# Patient Record
Sex: FEMALE | Race: BLACK OR AFRICAN AMERICAN | Employment: OTHER | URBAN - METROPOLITAN AREA
[De-identification: names, ages, dates, MRNs, and addresses within clinical notes are randomized per-mention and may not be internally consistent; named-entity substitution may affect disease eponyms.]

---

## 2017-01-18 ENCOUNTER — TELEPHONE (OUTPATIENT)
Dept: FAMILY MEDICINE CLINIC | Age: 72
End: 2017-01-18

## 2017-01-18 NOTE — TELEPHONE ENCOUNTER
Patient called to ask if there is another endocrinologist as she is asking for one in this area instead of Dr. Mandi Ayala.     Saba Rios MD

## 2017-03-09 ENCOUNTER — OFFICE VISIT (OUTPATIENT)
Dept: FAMILY MEDICINE CLINIC | Age: 72
End: 2017-03-09

## 2017-03-09 VITALS
OXYGEN SATURATION: 100 % | SYSTOLIC BLOOD PRESSURE: 141 MMHG | HEART RATE: 116 BPM | RESPIRATION RATE: 20 BRPM | BODY MASS INDEX: 34.78 KG/M2 | HEIGHT: 62 IN | DIASTOLIC BLOOD PRESSURE: 91 MMHG | TEMPERATURE: 98 F | WEIGHT: 189 LBS

## 2017-03-09 DIAGNOSIS — M54.9 BACK PAIN, UNSPECIFIED BACK LOCATION, UNSPECIFIED BACK PAIN LATERALITY, UNSPECIFIED CHRONICITY: ICD-10-CM

## 2017-03-09 DIAGNOSIS — M53.3 SACRO ILIAL PAIN: Primary | ICD-10-CM

## 2017-03-09 DIAGNOSIS — I10 ESSENTIAL HYPERTENSION: ICD-10-CM

## 2017-03-09 DIAGNOSIS — L98.9 SKIN LESION: ICD-10-CM

## 2017-03-09 RX ORDER — MELOXICAM 7.5 MG/1
7.5 TABLET ORAL DAILY
Qty: 14 TAB | Refills: 0 | Status: SHIPPED | OUTPATIENT
Start: 2017-03-09 | End: 2017-03-21 | Stop reason: ALTCHOICE

## 2017-03-09 NOTE — PROGRESS NOTES
Chief Complaint   Patient presents with    Hip Pain     right side x 2 months. pain radiates from hip to right knee     Reviewed record in preparation for visit and have obtained necessary documentation. 1. Have you been to the ER, urgent care clinic since your last visit? Hospitalized since your last visit? No    2. Have you seen or consulted any other health care providers outside of the [address] [place] [address] since your last visit? Include any pap smears or colon screening.  No

## 2017-03-09 NOTE — PROGRESS NOTES
Monae Barrett is a 73 y.o. female      Issues discussed today include:        Signs and symptoms:  Pain right SI area  Duration:  2 months  Context:  No known injury  Location:  Right SI  Quality:  Aches into right leg to mid calf  Severity:  No loss of strength or neuro sx  Timing:  daily  Modifying factors:  ALeve helps    Rx mobic      Data reviewed or ordered today:  XR    Other problems include:  Patient Active Problem List   Diagnosis Code    S/P colonoscopy Z98.890    Microalbuminuria R80.9    S/P hip replacement Z96.649    S/P PIA-BSO Z90.710, Z90.722, Z90.79    Lipid disorder E78.9    Cough due to ACE inhibitor R05, T46.4X5A    DDD (degenerative disc disease), cervical M50.30    Carpal tunnel syndrome on both sides G56.03    Type 2 diabetes mellitus without complication (HCC) K43.7    NELIDA on CPAP G47.33   [address] maintenance Z00.00    Advance directive discussed with patient Z70.80    Fracture of fibula, distal, right, closed S82.190M       Medications:  Current Outpatient Prescriptions   Medication Sig Dispense Refill    meloxicam (MOBIC) 7.5 mg tablet Take 1 Tab by mouth daily. 14 Tab 0    HUMALOG 100 unit/mL injection INJECT 30 UNITS SUBCUTANEOUSLY THREE TIMES A DAY WITH MEALS OR AS DIRECTED 3 Vial 3    insulin lispro (HUMALOG) 100 unit/mL injection INJECT 30 UNITS SUBCUTANEOUSLY THREE TIMES A DAY WITH MEALS OR AS DIRECTED 6 Vial 0    insulin glargine (LANTUS) 100 unit/mL injection INJECT 100 UNITS SUBCUTANEOUSLY AT BEDTIME OR AS DIRECTED 6 Vial 0    amLODIPine (NORVASC) 5 mg tablet Take 1 Tab by mouth daily. 30 Tab 11    losartan (COZAAR) 100 mg tablet Take 1 Tab by mouth daily. 30 Tab 11    metFORMIN (GLUCOPHAGE) 850 mg tablet TAKE ONE TABLET BY MOUTH TWICE DAILY WITH MEALS 60 Tab 11    atorvastatin (LIPITOR) 40 mg tablet TAKE TWO TABLETS BY MOUTH ONCE DAILY 30 Tab 11    aspirin 81 mg chewable tablet Take 1 tablet by mouth daily.  90 tablet 3    omega-3 fatty acids-vitamin e (FISH OIL) 1,000 mg cap Take 2 capsules by mouth daily. 180 capsule 3       Allergies: Allergies   Allergen Reactions    Lisinopril Cough       LMP:  Patient's last menstrual period was 02/21/1988. Social History     Social History    Marital status:      Spouse name: N/A    Number of children: N/A    Years of education: N/A     Occupational History    Not on file. Social History Main Topics    Smoking status: Former Smoker     Years: 10.00     Quit date: 6/2/2000    Smokeless tobacco: Never Used      Comment: RARE CIGARETTE WHEN SMOKING    Alcohol use 3.6 oz/week     6 Cans of beer per week      Comment: social / occasional    Drug use: No    Sexual activity: Not Currently     Other Topics Concern    Not on file     Social History Narrative         Family History   Problem Relation Age of Onset    Alcohol abuse Father     Cancer Brother     Heart Disease Brother     Arthritis-osteo Neg Hx     Asthma Neg Hx     Bleeding Prob Neg Hx     Diabetes Neg Hx     Elevated Lipids Neg Hx     Headache Neg Hx     Hypertension Neg Hx     Lung Disease Neg Hx     Migraines Neg Hx     Psychiatric Disorder Neg Hx     Stroke Neg Hx     Mental Retardation Neg Hx     Anesth Problems Neg Hx        Meaningful use:  done      ROS:  Headaches:  no  Chest Pain:  no  SOB:  no  Fevers:  no  Other significant ROS:  No neur sx    Patient's last menstrual period was 02/21/1988.     Physical Exam  Visit Vitals    BP (!) 141/91    Pulse (!) 116    Temp 98 °F (36.7 °C) (Oral)    Resp 20    Ht 5' 2\" (1.575 m)    Wt 189 lb (85.7 kg)    LMP 02/21/1988    SpO2 100%    BMI 34.57 kg/m2     BP Readings from Last 3 Encounters:   03/09/17 (!) 141/91   10/20/16 158/88   06/08/16 126/81     Constitutional:  Appears well,  No Acute Distress, Vitals noted  Psychiatric:   Affect normal, Alert and cooperative, Oriented to person/place/time      Skin:  Dime size odd looking lesion right arm     no tenderness over C-spine, T-spine, L-spine    Good flexibility of spine demonstrated     patellar reflexes normal and symmetrical    ankle jerk reflexes normal and symmetrical    strength of left leg and right leg seem normal    squats OK, heel standing/toe standing OK    + tenderness over right SI joint    no tenderness over left or right trochanteric bursa     straight leg raising is normal for right and left leg    full range of motion at both hips without pain    abdominal exam shows no bruits or masses          Assessment:    Patient Active Problem List   Diagnosis Code    S/P colonoscopy Z98.890    Microalbuminuria R80.9    S/P hip replacement Z96.649    S/P PIA-BSO Z90.710, Z90.722, Z90.79    Lipid disorder E78.9    Cough due to ACE inhibitor R05, T46.4X5A    DDD (degenerative disc disease), cervical M50.30    Carpal tunnel syndrome on both sides G56.03    Type 2 diabetes mellitus without complication (HCC) P35.8    NELIDA on CPAP G47.33    Healthcare maintenance Z00.00    Advance directive discussed with patient Z70.80    Fracture of fibula, distal, right, closed T80.266W       Today's diagnoses are:    ICD-10-CM ICD-9-CM    1. Sacro ilial pain M53.3 724.6 REFERRAL TO PHYSICAL THERAPY      XR SPINE LUMB 2 OR 3 V   2. Skin lesion L98.9 709.9 REFERRAL TO DERMATOLOGY   3. Back pain, unspecified back location, unspecified back pain laterality, unspecified chronicity M54.9 724.5 REFERRAL TO PHYSICAL THERAPY      XR SPINE LUMB 2 OR 3 V      meloxicam (MOBIC) 7.5 mg tablet   4. Essential hypertension I10 401.9     up due to pain       Plan:  Orders Placed This Encounter    XR SPINE LUMB 2 OR 3 V     Standing Status:   Future     Number of Occurrences:   1     Standing Expiration Date:   4/8/2018     Order Specific Question:   Reason for Exam     Answer:   back pain     Order Specific Question:   Is Patient Allergic to Contrast Dye?      Answer:   Unknown    REFERRAL TO PHYSICAL THERAPY     Referral Priority: Routine     Referral Type:   PT/OT/ST     Referral Reason:   Specialty Services Required    REFERRAL TO DERMATOLOGY     Referral Priority:   Routine     Referral Type:   Consultation     Referral Reason:   Specialty Services Required     Referred to Provider:   Vira Crook MD    meloxicam (MOBIC) 7.5 mg tablet     Sig: Take 1 Tab by mouth daily. Dispense:  14 Tab     Refill:  0     Take with food       See patient instructions  Patient Instructions   Recheck BP in 2 weeks    Take meloxicam with food    xrays    [facility] #[phone] or  [facility] #[phone]  Physical Therapy evaluate and treat:  Back pain    See Dr. Olinda Staton for your skin    Please call Meliza to help arrange and authorize your tests and/or referrals.   Her # dp [phone]             refresh note:  done    AVS Printed:  done

## 2017-03-09 NOTE — LETTER
3/9/2017 4:23 PM 
 
Ms. Christine Díaz [place] [id number] Body mass index is 34.57 kg/(m^2). Focus on regular exercise (150 minutes each week) and healthy eating. Eat more fruits and vegetables. Eat more protein (egg whites, beans, and nuts you know you tolerate) and less carbohydrates (white bread, white rice, white pasta, white potatoes, sodas, and sweets). Eat appropriately small portion sizes. Sincerely, Herlinda Gagnon MD

## 2017-03-09 NOTE — PATIENT INSTRUCTIONS
Recheck BP in 2 weeks    Take meloxicam with food    xrays    [facility] #[phone] or  [facility] #[phone]  Physical Therapy evaluate and treat:  Back pain    See Dr. Darvin Hurley for your skin    Please call Olga Huber to help arrange and authorize your tests and/or referrals.   Her # is [phone]

## 2017-03-09 NOTE — MR AVS SNAPSHOT
Visit Information Date & Time Provider Department Dept. Phone Encounter #  
 3/9/2017  3:45 PM Ezra Tamez MD [facility] [phone] [id number] Your Appointments 3/30/2017  9:00 AM  
New Patient with Constantin Vazquez MD  
[facility]) Appt Note: New pt, Diabetes, referred by Dr. Bingham [place] , scheduled by pt,; R/S  
 [address] [facility] [address] [phone]  
  
   
 [address] [place] [address] Upcoming Health Maintenance Date Due  
 EYE EXAM RETINAL OR DILATED Q1 10/20/2017* HEMOGLOBIN A1C Q6M 4/20/2017 BREAST CANCER SCRN MAMMOGRAM 7/28/2017 FOOT EXAM Q1 10/20/2017 MICROALBUMIN Q1 10/20/2017 LIPID PANEL Q1 10/20/2017 FOBT Q 1 YEAR AGE 50-75 10/20/2017 MEDICARE YEARLY EXAM 10/21/2017 GLAUCOMA SCREENING Q2Y 10/20/2018 DTaP/Tdap/Td series (2 - Td) 8/21/2024 *Topic was postponed. The date shown is not the original due date. Allergies as of 3/9/2017  Review Complete On: 3/9/2017 By: Ezra Tamez MD  
  
 Severity Noted Reaction Type Reactions Lisinopril  01/21/2013    Cough Current Immunizations  Reviewed on 3/9/2017 Name Date Influenza Vaccine (Quad) PF 10/20/2016 Influenza Vaccine Split 10/11/2012 Pneumococcal Polysaccharide (PPSV-23) 1/21/2013 Pneumococcal Vaccine (Pcv) 2/2/2010 Tdap 8/21/2014 Reviewed by Kristina Conte on 3/9/2017 at  3:59 PM  
You Were Diagnosed With   
  
 Codes Comments Sacro ilial pain    -  Primary ICD-10-CM: M53.3 ICD-9-CM: 724.6 Skin lesion     ICD-10-CM: L98.9 ICD-9-CM: 709.9 Back pain, unspecified back location, unspecified back pain laterality, unspecified chronicity     ICD-10-CM: M54.9 ICD-9-CM: 724.5 Essential hypertension     ICD-10-CM: I10 
ICD-9-CM: 401.9 up due to pain Vitals BP Pulse Temp Resp Height(growth percentile) Weight(growth percentile) (!) 141/91 (!) 116 98 °F (36.7 °C) (Oral) 20 5' 2\" (1.575 m) 189 lb (85.7 kg) LMP SpO2 BMI OB Status Smoking Status 02/21/1988 100% 34.57 kg/m2 Hysterectomy Former Smoker BMI and BSA Data Body Mass Index Body Surface Area 34.57 kg/m 2 1.94 m 2 Preferred Pharmacy Pharmacy Name Phone [facility] [address] [place] [address] [phone] Your Updated Medication List  
  
   
This list is accurate as of: 3/9/17  4:23 PM.  Always use your most recent med list. amLODIPine 5 mg tablet Commonly known as:  Damico Cater Take 1 Tab by mouth daily. aspirin 81 mg chewable tablet Take 1 tablet by mouth daily. atorvastatin 40 mg tablet Commonly known as:  LIPITOR  
TAKE TWO TABLETS BY MOUTH ONCE DAILY  
  
 insulin glargine 100 unit/mL injection Commonly known as:  LANTUS INJECT 100 UNITS SUBCUTANEOUSLY AT BEDTIME OR AS DIRECTED * insulin lispro 100 unit/mL injection Commonly known as:  HumaLOG INJECT 30 UNITS SUBCUTANEOUSLY THREE TIMES A DAY WITH MEALS OR AS DIRECTED * HumaLOG 100 unit/mL injection Generic drug:  insulin lispro INJECT 30 UNITS SUBCUTANEOUSLY THREE TIMES A DAY WITH MEALS OR AS DIRECTED  
  
 losartan 100 mg tablet Commonly known as:  COZAAR Take 1 Tab by mouth daily. meloxicam 7.5 mg tablet Commonly known as:  MOBIC Take 1 Tab by mouth daily. metFORMIN 850 mg tablet Commonly known as:  GLUCOPHAGE  
TAKE ONE TABLET BY MOUTH TWICE DAILY WITH MEALS  
  
 omega-3 fatty acids-vitamin e 1,000 mg Cap Commonly known as:  FISH OIL Take 2 capsules by mouth daily. * Notice: This list has 2 medication(s) that are the same as other medications prescribed for you. Read the directions carefully, and ask your doctor or other care provider to review them with you. Prescriptions Sent to Pharmacy Refills  
 meloxicam (MOBIC) 7.5 mg tablet 0 Sig: Take 1 Tab by mouth daily. Class: Normal  
 Pharmacy: [address] #: [phone] Route: Oral  
  
We Performed the Following REFERRAL TO DERMATOLOGY [REF19 Custom] Comments:  
 Please evaluate patient for skin lesion on arm. REFERRAL TO PHYSICAL THERAPY [UFX34 Custom] Comments:  
 Please evaluate patient for back pain. To-Do List   
 03/09/2017 Imaging:  XR SPINE LUMB 2 OR 3 V Referral Information Referral ID Referred By Referred To  
  
 [id number] Ovihenry Maribel Not Available Visits Status Start Date End Date 1 New Request 3/9/17 3/9/18 If your referral has a status of pending review or denied, additional information will be sent to support the outcome of this decision. Referral ID Referred By Referred To  
 [id number] Mary Garcia MD  
   [address] [facility] Phone: [phone] Fax: [phone] Visits Status Start Date End Date 1 New Request 3/9/17 3/9/18 If your referral has a status of pending review or denied, additional information will be sent to support the outcome of this decision. Patient Instructions Recheck BP in 2 weeks Take meloxicam with food 
 
xrays [facility] #[phone] or [facility] #[phone] Physical Therapy evaluate and treat:  Back pain See Dr. Angelica Gaston for your skin Please call Shea Bradshaw to help arrange and authorize your tests and/or referrals. Her # is [phone] Introducing [facility]! [employer] introduces [employer] patient portal. Now you can access parts of your medical record, email your doctor's office, and request medication refills online. 1. In your internet browser, go to [email or web address] 2. Click on the First Time User? Click Here link in the Sign In box. You will see the New Member Sign Up page. 3. Enter your [employer] Access Code exactly as it appears below. You will not need to use this code after youve completed the sign-up process. If you do not sign up before the expiration date, you must request a new code. · [employer] Access Code: [email or web address] Expires: 6/7/2017  4:23 PM 
 
4. Enter the last four digits of your Social Security Number (xxxx) and Date of Birth (mm/dd/yyyy) as indicated and click Submit. You will be taken to the next sign-up page. 5. Create a [employer] ID. This will be your [employer] login ID and cannot be changed, so think of one that is secure and easy to remember. 6. Create a [employer] password. You can change your password at any time. 7. Enter your Password Reset Question and Answer. This can be used at a later time if you forget your password. 8. Enter your e-mail address. You will receive e-mail notification when new information is available in [address] 9. Click Sign Up. You can now view and download portions of your medical record. 10. Click the Download Summary menu link to download a portable copy of your medical information. If you have questions, please visit the Frequently Asked Questions section of the [employer] website. Remember, [employer] is NOT to be used for urgent needs. For medical emergencies, dial 911. Now available from your iPhone and Android! Please provide this summary of care documentation to your next provider. Your primary care clinician is listed as Rosita Rivers. If you have any questions after today's visit, please call [phone].

## 2017-03-21 ENCOUNTER — OFFICE VISIT (OUTPATIENT)
Dept: FAMILY MEDICINE CLINIC | Age: 72
End: 2017-03-21

## 2017-03-21 VITALS
SYSTOLIC BLOOD PRESSURE: 154 MMHG | RESPIRATION RATE: 18 BRPM | BODY MASS INDEX: 34.96 KG/M2 | WEIGHT: 190 LBS | HEIGHT: 62 IN | HEART RATE: 103 BPM | DIASTOLIC BLOOD PRESSURE: 83 MMHG | TEMPERATURE: 97.8 F | OXYGEN SATURATION: 100 %

## 2017-03-21 DIAGNOSIS — E11.9 TYPE 2 DIABETES MELLITUS WITHOUT COMPLICATION, UNSPECIFIED LONG TERM INSULIN USE STATUS: ICD-10-CM

## 2017-03-21 DIAGNOSIS — I10 ESSENTIAL HYPERTENSION: Primary | ICD-10-CM

## 2017-03-21 DIAGNOSIS — Z13.89 SCREENING FOR OBESITY: ICD-10-CM

## 2017-03-21 RX ORDER — BISOPROLOL FUMARATE AND HYDROCHLOROTHIAZIDE 5; 6.25 MG/1; MG/1
1 TABLET ORAL DAILY
Qty: 30 TAB | Refills: 11 | Status: SHIPPED | OUTPATIENT
Start: 2017-03-21 | End: 2017-08-03 | Stop reason: ALTCHOICE

## 2017-03-21 NOTE — MR AVS SNAPSHOT
Visit Information Date & Time Provider Department Dept. Phone Encounter #  
 3/21/2017  2:15 PM Paramjit Diaz MD [facility] [phone] [id number] Your Appointments 3/30/2017  9:00 AM  
New Patient with Zachary Sands MD  
Care [facility] Endocrinology [facility]) Appt Note: New pt, Diabetes, referred by Dr. Barajas Delay , scheduled by pt,; R/S  
 [address] [facility] [address]  
[phone]  
  
   
 [address] [place] [address] Upcoming Health Maintenance Date Due  
 EYE EXAM RETINAL OR DILATED Q1 10/20/2017* BREAST CANCER SCRN MAMMOGRAM 7/28/2017 HEMOGLOBIN A1C Q6M 9/21/2017 FOOT EXAM Q1 10/20/2017 MICROALBUMIN Q1 10/20/2017 LIPID PANEL Q1 10/20/2017 FOBT Q 1 YEAR AGE 50-75 10/20/2017 MEDICARE YEARLY EXAM 10/21/2017 GLAUCOMA SCREENING Q2Y 10/20/2018 DTaP/Tdap/Td series (2 - Td) 8/21/2024 *Topic was postponed. The date shown is not the original due date. Allergies as of 3/21/2017  Review Complete On: 3/21/2017 By: Paramjit Diaz MD  
  
 Severity Noted Reaction Type Reactions Lisinopril  01/21/2013    Cough Current Immunizations  Reviewed on 3/21/2017 Name Date Influenza Vaccine (Quad) PF 10/20/2016 Influenza Vaccine Split 10/11/2012 Pneumococcal Polysaccharide (PPSV-23) 1/21/2013 Pneumococcal Vaccine (Pcv) 2/2/2010 Tdap 8/21/2014 Reviewed by Pancho Newell on 3/21/2017 at  1:57 PM  
 Reviewed by Pancho Newell on 3/21/2017 at  1:57 PM  
You Were Diagnosed With   
  
 Codes Comments Essential hypertension    -  Primary ICD-10-CM: I10 
ICD-9-CM: 401.9 Type 2 diabetes mellitus without complication, unspecified long term insulin use status (HCC)     ICD-10-CM: E11.9 ICD-9-CM: 250.00 BMI 34.0-34.9,adult     ICD-10-CM: R18.92 
ICD-9-CM: V85.34 Vitals BP Pulse Temp Resp Height(growth percentile) Weight(growth percentile) 154/83 (!) 103 97.8 °F (36.6 °C) (Oral) 18 5' 2\" (1.575 m) 190 lb (86.2 kg) LMP SpO2 BMI OB Status Smoking Status 02/21/1988 100% 34.75 kg/m2 Hysterectomy Former Smoker Vitals History BMI and BSA Data Body Mass Index Body Surface Area 34.75 kg/m 2 1.94 m 2 Preferred Pharmacy Pharmacy Name Phone [facility] [address] [phone] Your Updated Medication List  
  
   
This list is accurate as of: 3/21/17  2:30 PM.  Always use your most recent med list.  
  
  
  
  
 aspirin 81 mg chewable tablet Take 1 tablet by mouth daily. atorvastatin 40 mg tablet Commonly known as:  LIPITOR  
TAKE TWO TABLETS BY MOUTH ONCE DAILY  
  
 bisoprolol-hydroCHLOROthiazide 5-6.25 mg per tablet Commonly known as:  [facility] Take 1 Tab by mouth daily. insulin glargine 100 unit/mL injection Commonly known as:  LANTUS INJECT 100 UNITS SUBCUTANEOUSLY AT BEDTIME OR AS DIRECTED * insulin lispro 100 unit/mL injection Commonly known as:  HumaLOG INJECT 30 UNITS SUBCUTANEOUSLY THREE TIMES A DAY WITH MEALS OR AS DIRECTED * HumaLOG 100 unit/mL injection Generic drug:  insulin lispro INJECT 30 UNITS SUBCUTANEOUSLY THREE TIMES A DAY WITH MEALS OR AS DIRECTED  
  
 losartan 100 mg tablet Commonly known as:  COZAAR Take 1 Tab by mouth daily. metFORMIN 850 mg tablet Commonly known as:  GLUCOPHAGE  
TAKE ONE TABLET BY MOUTH TWICE DAILY WITH MEALS  
  
 omega-3 fatty acids-vitamin e 1,000 mg Cap Commonly known as:  FISH OIL Take 2 capsules by mouth daily. * Notice: This list has 2 medication(s) that are the same as other medications prescribed for you. Read the directions carefully, and ask your doctor or other care provider to review them with you. Prescriptions Sent to Pharmacy Refills bisoprolol-hydroCHLOROthiazide (ZIAC) 5-6.25 mg per tablet 11 Sig: Take 1 Tab by mouth daily. Class: Normal  
 Pharmacy: [address] Shoshana Causey, [address] [place] [address] #: [phone] Route: Oral  
  
We Performed the Following HEMOGLOBIN A1C WITH EAG [05262 CPT(R)] METABOLIC PANEL, BASIC [73009 CPT(R)] Patient Instructions Labs today Stop amlodipine Start bisoprolol 5 mg daily, continue losartan Recheck BP in 2-4 weeks Focus on regular exercise (150 minutes each week) and healthy eating. Eat more fruits and vegetables. Eat more protein (egg whites, beans, and nuts you know you tolerate) and less carbohydrates (white bread, white rice, white pasta, white potatoes, sodas, and sweets). Eat appropriately small portion sizes. Introducing [facility]! [facility] introduces [email or web address] patient portal. Now you can access parts of your medical record, email your doctor's office, and request medication refills online. 1. In your internet browser, go to [email or web address] 2. Click on the First Time User? Click Here link in the Sign In box. You will see the New Member Sign Up page. 3. Enter your [email or web address] Access Code exactly as it appears below. You will not need to use this code after youve completed the sign-up process. If you do not sign up before the expiration date, you must request a new code. · [email or web address] Access Code: [email or web address] Expires: 6/7/2017  5:23 PM 
 
4. Enter the last four digits of your Social Security Number (xxxx) and Date of Birth (mm/dd/yyyy) as indicated and click Submit. You will be taken to the next sign-up page. 5. Create a [employer] ID. This will be your [email or web address] login ID and cannot be changed, so think of one that is secure and easy to remember. 6. Create a [employer] password. You can change your password at any time. 7. Enter your Password Reset Question and Answer.  This can be used at a later time if you forget your password. 8. Enter your e-mail address. You will receive e-mail notification when new information is available in [address] 9. Click Sign Up. You can now view and download portions of your medical record. 10. Click the Download Summary menu link to download a portable copy of your medical information. If you have questions, please visit the Frequently Asked Questions section of the [employer] website. Remember, [employer] is NOT to be used for urgent needs. For medical emergencies, dial 911. Now available from your iPhone and Android! Please provide this summary of care documentation to your next provider. Your primary care clinician is listed as Dee Majano. If you have any questions after today's visit, please call [phone].

## 2017-03-21 NOTE — PROGRESS NOTES
Maxim Marshall is a 73 y.o. female      Issues discussed today include:      BP check:  up a bit    Data reviewed or ordered today:  labs    Other problems include:  Patient Active Problem List   Diagnosis Code    S/P colonoscopy Z98.890    Microalbuminuria R80.9    S/P hip replacement Z96.649    S/P PIA-BSO Z90.710, Z90.722, Z90.79    Lipid disorder E78.9    Cough due to ACE inhibitor R05, T46.4X5A    DDD (degenerative disc disease), cervical M50.30    Carpal tunnel syndrome on both sides G56.03    Type 2 diabetes mellitus without complication ([facility] [address] J15.2    NELIDA on CPAP G47.33   [address] maintenance Z00.00    Advance directive discussed with patient Z70.80    Fracture of fibula, distal, right, closed S82.636F       Medications:  Current Outpatient Prescriptions   Medication Sig Dispense Refill    bisoprolol-hydroCHLOROthiazide (ZIAC) 5-6.25 mg per tablet Take 1 Tab by mouth daily. 30 Tab 11    HUMALOG 100 unit/mL injection INJECT 30 UNITS SUBCUTANEOUSLY THREE TIMES A DAY WITH MEALS OR AS DIRECTED 3 Vial 3    insulin glargine (LANTUS) 100 unit/mL injection INJECT 100 UNITS SUBCUTANEOUSLY AT BEDTIME OR AS DIRECTED 6 Vial 0    losartan (COZAAR) 100 mg tablet Take 1 Tab by mouth daily. 30 Tab 11    metFORMIN (GLUCOPHAGE) 850 mg tablet TAKE ONE TABLET BY MOUTH TWICE DAILY WITH MEALS 60 Tab 11    atorvastatin (LIPITOR) 40 mg tablet TAKE TWO TABLETS BY MOUTH ONCE DAILY 30 Tab 11    aspirin 81 mg chewable tablet Take 1 tablet by mouth daily. 90 tablet 3       Allergies: Allergies   Allergen Reactions    Lisinopril Cough       LMP:  Patient's last menstrual period was 02/21/1988. Social History     Social History    Marital status:      Spouse name: N/A    Number of children: N/A    Years of education: N/A     Occupational History    Not on file.      Social History Main Topics    Smoking status: Former Smoker     Years: 10.00     Quit date: 6/2/2000    Smokeless tobacco: Never Used      Comment: RARE CIGARETTE WHEN SMOKING    Alcohol use 3.6 oz/week     6 Cans of beer per week      Comment: social / occasional    Drug use: No    Sexual activity: Not Currently     Other Topics Concern    Not on file     Social History Narrative         Family History   Problem Relation Age of Onset    Alcohol abuse Father     Cancer Brother     Heart Disease Brother     Arthritis-osteo Neg Hx     Asthma Neg Hx     Bleeding Prob Neg Hx     Diabetes Neg Hx     Elevated Lipids Neg Hx     Headache Neg Hx     Hypertension Neg Hx     Lung Disease Neg Hx     Migraines Neg Hx     Psychiatric Disorder Neg Hx     Stroke Neg Hx     Mental Retardation Neg Hx     Anesth Problems Neg Hx      Other family history:      Meaningful use:  done      ROS:  Headaches:  no  Chest Pain:  no  SOB:  no  Fevers:  no  Other significant ROS:  She is NOT wearing CPAP, no falls/depression    Patient's last menstrual period was 02/21/1988.     Physical Exam  Visit Vitals    /83    Pulse (!) 103    Temp 97.8 °F (36.6 °C) (Oral)    Resp 18    Ht 5' 2\" (1.575 m)    Wt 190 lb (86.2 kg)    LMP 02/21/1988    SpO2 100%    BMI 34.75 kg/m2     BP Readings from Last 3 Encounters:   03/21/17 154/83   03/09/17 (!) 141/91   10/20/16 158/88     Constitutional:  Appears well,  No Acute Distress, Vitals noted  Psychiatric:   Affect normal, Alert and cooperative, Oriented to person/place/time    Extremities:  trace edema, good peripheral pulses  Skin:   Warm to palpation, without rashes, bruising, or suspicious lesions           Assessment:    Patient Active Problem List   Diagnosis Code    S/P colonoscopy Z98.890    Microalbuminuria R80.9    S/P hip replacement Z96.649    S/P PIA-BSO Z90.710, Z90.722, Z90.79    Lipid disorder E78.9    Cough due to ACE inhibitor R05, T46.4X5A    DDD (degenerative disc disease), cervical M50.30    Carpal tunnel syndrome on both sides G56.03    Type 2 diabetes mellitus without complication ([facility] [address] Z51.1    NELIDA on CPAP G47.33    Healthcare maintenance Z00.00    Advance directive discussed with patient Z70.80    Fracture of fibula, distal, right, closed S82.648D       Today's diagnoses are:    ICD-10-CM ICD-9-CM    1. Essential hypertension R71 113.8 METABOLIC PANEL, BASIC      bisoprolol-hydroCHLOROthiazide (ZIAC) 5-6.25 mg per tablet   2. Type 2 diabetes mellitus without complication, unspecified long term insulin use status (HCC) E11.9 250.00 HEMOGLOBIN A1C WITH EAG   3. BMI 34.0-34.9,adult Z68.34 V85.34    4. Screening for obesity Z13.89 V77.8 NM DEPRESSION SCREEN ANNUAL       Plan:  Orders Placed This Encounter    HEMOGLOBIN A1C WITH EAG    METABOLIC PANEL, BASIC    NM DEPRESSION SCREEN ANNUAL    bisoprolol-hydroCHLOROthiazide [facility]) 5-6.25 mg per tablet     Sig: Take 1 Tab by mouth daily. Dispense:  30 Tab     Refill:  11     This replaces amlodipine       See patient instructions  Patient Instructions   Labs today    Stop amlodipine    Start bisoprolol 5 mg daily, continue losartan    Recheck BP in 2-4 weeks    Focus on regular exercise (150 minutes each week) and healthy eating. Eat more fruits and vegetables. Eat more protein (egg whites, beans, and nuts you know you tolerate) and less carbohydrates (white bread, white rice, white pasta, white potatoes, sodas, and sweets). Eat appropriately small portion sizes. refresh note:  done    AVS Printed:  done    I have reviewed/discussed the above normal BMI with the patient. I have recommended the following interventions: encourage exercise . The plan is as follows: I have counseled this patient on diet and exercise regimens. see letter.

## 2017-03-21 NOTE — LETTER
3/23/2017 8:25 AM 
 
Ms. Neil Mercedes [address] [phone] Dear Neil Mercedes: 
 
Please find your most recent results below. Resulted Orders HEMOGLOBIN A1C WITH EAG Result Value Ref Range Hemoglobin A1c 12.0 (H) 4.8 - 5.6 % Comment:  
            Pre-diabetes: 5.7 - 6.4 Diabetes: >6.4 Glycemic control for adults with diabetes: <7.0 Estimated average glucose 298 mg/dL Narrative Performed at:  [address]  [id number] : Kayla Barron MD, Phone:  [phone] METABOLIC PANEL, BASIC Result Value Ref Range Glucose 71 65 - 99 mg/dL BUN 12 8 - 27 mg/dL Creatinine 0.87 0.57 - 1.00 mg/dL GFR est non-AA 68 >59 mL/min/1.73 GFR est AA 79 >59 mL/min/1.73  
 BUN/Creatinine ratio 14 11 - 26 Sodium 144 134 - 144 mmol/L Potassium 3.7 3.5 - 5.2 mmol/L Chloride 101 96 - 106 mmol/L  
 CO2 22 18 - 29 mmol/L Calcium 10.3 8.7 - 10.3 mg/dL Narrative Performed at:  [address]  [id number] : Kayla Barron MD, Phone:  [phone] Your diabetes is poorly controlled.  Increase metformin to 3 pills daily (one pill 3 times a day with meals or 2 pills in AM and one pill in PM). I also suggest seeing Dr. Joe Tello again soon. Please call Olga Huber to help arrange and authorize any tests and/or referrals. NANCY BERUMEN [facility] # [phone] Sincerely, Marlon Ochoa MD

## 2017-03-22 DIAGNOSIS — E11.9 TYPE 2 DIABETES MELLITUS WITHOUT COMPLICATION, UNSPECIFIED LONG TERM INSULIN USE STATUS: ICD-10-CM

## 2017-03-22 LAB
BUN SERPL-MCNC: 12 MG/DL (ref 8–27)
BUN/CREAT SERPL: 14 (ref 11–26)
CALCIUM SERPL-MCNC: 10.3 MG/DL (ref 8.7–10.3)
CHLORIDE SERPL-SCNC: 101 MMOL/L (ref 96–106)
CO2 SERPL-SCNC: 22 MMOL/L (ref 18–29)
CREAT SERPL-MCNC: 0.87 MG/DL (ref 0.57–1)
EST. AVERAGE GLUCOSE BLD GHB EST-MCNC: 298 MG/DL
GLUCOSE SERPL-MCNC: 71 MG/DL (ref 65–99)
HBA1C MFR BLD: 12 % (ref 4.8–5.6)
POTASSIUM SERPL-SCNC: 3.7 MMOL/L (ref 3.5–5.2)
SODIUM SERPL-SCNC: 144 MMOL/L (ref 134–144)

## 2017-03-22 RX ORDER — METFORMIN HYDROCHLORIDE 850 MG/1
TABLET ORAL
Qty: 270 TAB | Refills: 3 | Status: SHIPPED | OUTPATIENT
Start: 2017-03-22 | End: 2017-11-09 | Stop reason: SDUPTHER

## 2017-03-22 NOTE — PROGRESS NOTES
Your diabetes is poorly controlled. Increase metformin to 3 pills daily (one pill 3 times a day with meals or 2 pills in AM and one pill in PM). I also suggest seeing Dr. Anette Carpenter again soon. Please call Betito Valle to help arrange and authorize any tests and/or referrals.   Her # is [phone]

## 2017-08-03 ENCOUNTER — OFFICE VISIT (OUTPATIENT)
Dept: ENDOCRINOLOGY | Age: 72
End: 2017-08-03

## 2017-08-03 ENCOUNTER — TELEPHONE (OUTPATIENT)
Dept: ENDOCRINOLOGY | Age: 72
End: 2017-08-03

## 2017-08-03 VITALS
HEART RATE: 74 BPM | BODY MASS INDEX: 33.27 KG/M2 | DIASTOLIC BLOOD PRESSURE: 110 MMHG | HEIGHT: 62 IN | WEIGHT: 180.8 LBS | RESPIRATION RATE: 16 BRPM | SYSTOLIC BLOOD PRESSURE: 170 MMHG | OXYGEN SATURATION: 99 %

## 2017-08-03 DIAGNOSIS — E66.9 OBESITY (BMI 30-39.9): ICD-10-CM

## 2017-08-03 DIAGNOSIS — E11.65 TYPE 2 DIABETES MELLITUS WITH HYPERGLYCEMIA, WITH LONG-TERM CURRENT USE OF INSULIN (HCC): ICD-10-CM

## 2017-08-03 DIAGNOSIS — Z79.4 TYPE 2 DIABETES MELLITUS WITH HYPERGLYCEMIA, WITH LONG-TERM CURRENT USE OF INSULIN (HCC): ICD-10-CM

## 2017-08-03 DIAGNOSIS — I10 ESSENTIAL HYPERTENSION: ICD-10-CM

## 2017-08-03 DIAGNOSIS — E11.9 TYPE 2 DIABETES MELLITUS WITHOUT COMPLICATION, UNSPECIFIED LONG TERM INSULIN USE STATUS: Primary | ICD-10-CM

## 2017-08-03 DIAGNOSIS — R80.9 MICROALBUMINURIA: ICD-10-CM

## 2017-08-03 RX ORDER — CHLORTHALIDONE 25 MG/1
TABLET ORAL DAILY
COMMUNITY
End: 2017-08-03 | Stop reason: SDUPTHER

## 2017-08-03 RX ORDER — PEN NEEDLE, DIABETIC 31 GX3/16"
1 NEEDLE, DISPOSABLE MISCELLANEOUS DAILY
Qty: 100 PEN NEEDLE | Refills: 3 | Status: SHIPPED | OUTPATIENT
Start: 2017-08-03 | End: 2017-11-09 | Stop reason: ALTCHOICE

## 2017-08-03 RX ORDER — CHLORTHALIDONE 25 MG/1
25 TABLET ORAL DAILY
Qty: 30 TAB | Refills: 5 | Status: SHIPPED | OUTPATIENT
Start: 2017-08-03 | End: 2019-11-03 | Stop reason: SDUPTHER

## 2017-08-03 NOTE — MR AVS SNAPSHOT
Visit Information Date & Time Provider Department Dept. Phone Encounter #  
 8/3/2017  8:30 AM Giovany Mcadams MD [facility] [phone] [id number] Upcoming Health Maintenance Date Due  
 BREAST CANCER SCRN MAMMOGRAM 7/28/2017 INFLUENZA AGE 9 TO ADULT 8/1/2017 EYE EXAM RETINAL OR DILATED Q1 10/20/2017* HEMOGLOBIN A1C Q6M 9/21/2017 FOOT EXAM Q1 10/20/2017 MICROALBUMIN Q1 10/20/2017 LIPID PANEL Q1 10/20/2017 FOBT Q 1 YEAR AGE 50-75 10/20/2017 MEDICARE YEARLY EXAM 10/21/2017 GLAUCOMA SCREENING Q2Y 10/20/2018 DTaP/Tdap/Td series (2 - Td) 8/21/2024 *Topic was postponed. The date shown is not the original due date. Allergies as of 8/3/2017  Review Complete On: 8/3/2017 By: Giovany Mcadams MD  
  
 Severity Noted Reaction Type Reactions Lisinopril  01/21/2013    Cough Current Immunizations  Reviewed on 3/21/2017 Name Date Influenza Vaccine (Quad) PF 10/20/2016 Influenza Vaccine Split 10/11/2012 Pneumococcal Polysaccharide (PPSV-23) 1/21/2013 Pneumococcal Vaccine (Pcv) 2/2/2010 Tdap 8/21/2014 Not reviewed this visit You Were Diagnosed With   
  
 Codes Comments Type 2 diabetes mellitus without complication, unspecified long term insulin use status (HCC)    -  Primary ICD-10-CM: E11.9 ICD-9-CM: 250.00 Vitals BP Pulse Resp Height(growth percentile) Weight(growth percentile) LMP  
 (!) 170/110 (BP 1 Location: Left arm, BP Patient Position: Sitting) 74 16 5' 2\" (1.575 m) 180 lb 12.8 oz (82 kg) 02/21/1988 SpO2 BMI OB Status Smoking Status 99% 33.07 kg/m2 Hysterectomy Former Smoker Vitals History BMI and BSA Data Body Mass Index Body Surface Area 33.07 kg/m 2 1.89 m 2 Preferred Pharmacy Pharmacy Name Phone [facility] [address] [phone] Your Updated Medication List  
  
   
 This list is accurate as of: 8/3/17  9:38 AM.  Always use your most recent med list.  
  
  
  
  
 aspirin 81 mg chewable tablet Take 1 tablet by mouth daily. atorvastatin 40 mg tablet Commonly known as:  LIPITOR  
TAKE TWO TABLETS BY MOUTH ONCE DAILY * chlorthalidone 25 mg tablet Commonly known as:  García Schlichter Take  by mouth daily. * chlorthalidone 25 mg tablet Commonly known as:  García Schlichter Take 1 Tab by mouth daily. HumaLOG 100 unit/mL injection Generic drug:  insulin lispro INJECT 30 UNITS SUBCUTANEOUSLY THREE TIMES A DAY WITH MEALS OR AS DIRECTED  
  
 insulin glargine 100 unit/mL injection Commonly known as:  LANTUS INJECT 100 UNITS SUBCUTANEOUSLY AT BEDTIME OR AS DIRECTED Liraglutide 0.6 mg/0.1 mL (18 mg/3 mL) sub-q pen Commonly known as:  VICTOZA 2-TREVON  
0.3 mL by SubCUTAneous route daily. losartan 100 mg tablet Commonly known as:  COZAAR Take 1 Tab by mouth daily. metFORMIN 850 mg tablet Commonly known as:  GLUCOPHAGE  
TAKE ONE TABLET BY MOUTH three times DAILY WITH MEALS * Notice: This list has 2 medication(s) that are the same as other medications prescribed for you. Read the directions carefully, and ask your doctor or other care provider to review them with you. Prescriptions Sent to Pharmacy Refills Liraglutide (VICTOZA 2-TREVON) 0.6 mg/0.1 mL (18 mg/3 mL) sub-q pen 3 Si.3 mL by SubCUTAneous route daily. Class: Normal  
 Pharmacy: [address] Ph #: [phone] Route: SubCUTAneous  
 chlorthalidone (HYGROTEN) 25 mg tablet 5 Sig: Take 1 Tab by mouth daily. Class: Normal  
 Pharmacy: [address] Ph #: [phone] Route: Oral  
  
We Performed the Following HEMOGLOBIN A1C WITH EAG [70082 CPT(R)] MICROALBUMIN, UR, RAND W/ MICROALBUMIN/CREA RATIO [id number] CPT(R)] Patient Instructions Diabetes Instructions: 
- start Victoza according to the instruction below, decrease Humalog depending on the Victoza instructions 
 - 0.6mg dose: take 20 units of Humalog with meals 
 - 1.2mg dose: take 10 units of Humalog with meals 
 - 1.8mg dose: stop taking Humalog 
- continue Lantus 100 units at night: split this into two injections of 50 units each 
- continue metformin 1000mg twice a day We will use victoza to help with Hemoglobin A1c (3 month test of blood sugar) and weight loss. Take this once daily every morning or in the evening at the same time each day and it doesn't matter if this is before or after a meal.  Start at the 0.6 mg dose for 1 week. If you are tolerating this without significant nausea, increase to the 1.2 mg dose for 1 week. If still tolerating, go up to 1.8 mg daily. If you are able to tolerate this at the 1.2 mg dose, go ahead and fill the prescription as this may need an authorization. If you can't tolerate the 1.8 mg dose, then stay at the highest dose you can tolerate. Watch out for any severe abdominal pain that goes to the back and is associated with nausea or vomiting as this may be due to pancreatitis which is the most severe but rarest side effect of this medication. Please notify me if this occurs. Check blood sugar at least TWICE a day: every morning when you wake up and at bedtime. Keep a record of your values and bring this or your glucometer with you to your next visit. Diet instructions: 
Reduce the amount of carbohydrates in your diet. This means not just avoiding sweets, sodas, or desserts but also reducing the amount of bread, pasta, rice, potatoes, corn, and crackers that you eat. Do not have more than one serving of carbs per meal (for example: do not eat a sandwich and potato chips in the same meal).  
 
Focus on eating mostly protein (meat, poultry, fish, shellfish, eggs), healthy fats (avocados, nuts, cheese, olive or coconut oil) and non-starchy vegetables (greens, carrots, tomatoes, bell peppers, broccoli, brussels sprouts, green beans, etc). If you fill yourself up with these foods, you won't even want the carbs. Introducing [facility]! Anaya Stoddard introduces [employer] patient portal. Now you can access parts of your medical record, email your doctor's office, and request medication refills online. 1. In your internet browser, go to [email or web address] 2. Click on the First Time User? Click Here link in the Sign In box. You will see the New Member Sign Up page. 3. Enter your [employer] Access Code exactly as it appears below. You will not need to use this code after youve completed the sign-up process. If you do not sign up before the expiration date, you must request a new code. · [employer] Access Code: [email or web address] Expires: 11/1/2017  9:13 AM 
 
4. Enter the last four digits of your Social Security Number (xxxx) and Date of Birth (mm/dd/yyyy) as indicated and click Submit. You will be taken to the next sign-up page. 5. Create a [employer] ID. This will be your [employer] login ID and cannot be changed, so think of one that is secure and easy to remember. 6. Create a [employer] password. You can change your password at any time. 7. Enter your Password Reset Question and Answer. This can be used at a later time if you forget your password. 8. Enter your e-mail address. You will receive e-mail notification when new information is available in [address] 9. Click Sign Up. You can now view and download portions of your medical record. 10. Click the Download Summary menu link to download a portable copy of your medical information. If you have questions, please visit the Frequently Asked Questions section of the [employer] website. Remember, [employer] is NOT to be used for urgent needs. For medical emergencies, dial 911. Now available from your iPhone and Android! Please provide this summary of care documentation to your next provider. Your primary care clinician is listed as Radames Camarena. If you have any questions after today's visit, please call [phone].

## 2017-08-03 NOTE — PROGRESS NOTES
Rob Speaker is a 73 y.o. female    Chief Complaint   Patient presents with    Diabetes    Other     Pt stated that Dr. Gimenez Gentleman wants her to see Dr. Osmin Toney to get her diabetes under contorl.

## 2017-08-03 NOTE — PROGRESS NOTES
Endocrinology New Patient Visit    Chief Complaint: Type 2 diabetes, uncontrolled    Referring provider:  Adrian Reyes MD    History of Present Illness: Jaz Licea is a 73 y.o. female who was referred for evaluation of uncontrolled type 2 diabetes mellitus with last A1c 12% in March. She was diagnosed with diabetes in 2003. She was initially treated with metformin and insulin. Current glycemic medication regimen is Humalog 33 units TIDac, Lantus 100 units Qhs, and metformin 1000mg BID. Home blood glucose monitoring frequency: three times/day before each meal  Glucose range at home: 188-300; most values in the 200s  The patient has not had hypoglycemia (BG<70) but has had symptoms of hypoglycemia when her blood sugar is in the 90s or low 100s. This tends to occur if she takes her Humalog insulin without a big meal.     Known complications include microalbuminuria, last Cr and GFR was normal though. Last eye exam was a few months ago, reports cataracts but no dx of diabetic retinopathy. Denies numbness/tingling in her feet. She does have HTN, was previously well controlled on losartan-amlodipine but she was recently switched to [address] [place] [address] (but unsure if she is taking this medication, says it doesn't sound familiar). Weight is down about 50 lbs from her max weight in 2013. BMI is 33 but previously was 41. She has not intentionally lost weight, in fact another 20 lbs just came off in the past few months without any changes in her daily habits. She previously walked for exercise but stopped doing this because she was worried she was losing too much weight. Diet at home is described as somewhat unhealthy, loves to snack on chips and admits she craves carbs. Says she fries something every day also drinks [employer] on occasion. Eats 3 meals/day and does snack.  Typical meals are as follows:  Breakfast: coffee, scrambled eggs, daniels, toast with jelly  Lunch: tossed salad, sandwich  Dinner: fried pork or chicken, greens, potatoes or corn    Review of Systems   General ROS: negative  Ophthalmic ROS: negative  ENT ROS: negative  Endocrine ROS: negative  Respiratory ROS: no cough, shortness of breath, or wheezing  Cardiovascular ROS: no chest pain or dyspnea on exertion  Gastrointestinal ROS: no abdominal pain, change in bowel habits, or black or bloody stools  Genito-Urinary ROS: no dysuria, trouble voiding, or hematuria  Musculoskeletal ROS: negative  Neurological ROS: negative for - headaches or numbness/tingling  Dermatological ROS: negative    Problem List:  Patient Active Problem List   Diagnosis Code    S/P colonoscopy Z98.890    Microalbuminuria R80.9    S/P hip replacement Z96.649    S/P PIA-BSO Z90.710, Z90.722, Z90.79    Lipid disorder E78.9    Cough due to ACE inhibitor R05, T46.4X5A    DDD (degenerative disc disease), cervical M50.30    Carpal tunnel syndrome on both sides G56.03    Type 2 diabetes mellitus without complication (HCC) U26.3    NELIDA on CPAP G47.33, Z99.89    Healthcare maintenance Z00.00    Advance directive discussed with patient Z70.80    Fracture of fibula, distal, right, closed X39.416Y       Past Medical History:    Past Medical History:   Diagnosis Date    Diabetes ([facility] [address]     Hypercholesterolemia     Hypertension        Past Surgical History:  Past Surgical History:   Procedure Laterality Date    BREAST SURGERY PROCEDURE UNLISTED      benign breast tumor    HX ANKLE FRACTURE TX Right 06/2015    HX COLONOSCOPY      HX HIP REPLACEMENT Left     HX HYSTERECTOMY         Social History:  Social History     Social History    Marital status:      Spouse name: N/A    Number of children: N/A    Years of education: N/A     Occupational History    Not on file.      Social History Main Topics    Smoking status: Former Smoker     Years: 10.00     Quit date: 6/2/2000    Smokeless tobacco: Never Used      Comment: RARE CIGARETTE WHEN SMOKING    Alcohol use 3.6 oz/week     6 Cans of beer per week      Comment: social / occasional    Drug use: No    Sexual activity: Not Currently     Other Topics Concern    Not on file     Social History Narrative       Family History:  Family History   Problem Relation Age of Onset    Alcohol abuse Father     Cancer Brother     Heart Disease Brother     Arthritis-osteo Neg Hx     Asthma Neg Hx     Bleeding Prob Neg Hx     Diabetes Neg Hx     Elevated Lipids Neg Hx     Headache Neg Hx     Hypertension Neg Hx     Lung Disease Neg Hx     Migraines Neg Hx     Psychiatric Disorder Neg Hx     Stroke Neg Hx     Mental Retardation Neg Hx     Anesth Problems Neg Hx        Medications:     Current Outpatient Prescriptions:     metFORMIN (GLUCOPHAGE) 850 mg tablet, TAKE ONE TABLET BY MOUTH three times DAILY WITH MEALS, Disp: 270 Tab, Rfl: 3    bisoprolol-hydroCHLOROthiazide (ZIAC) 5-6.25 mg per tablet, Take 1 Tab by mouth daily. , Disp: 30 Tab, Rfl: 11    HUMALOG 100 unit/mL injection, INJECT 30 UNITS SUBCUTANEOUSLY THREE TIMES A DAY WITH MEALS OR AS DIRECTED, Disp: 3 Vial, Rfl: 3    insulin glargine (LANTUS) 100 unit/mL injection, INJECT 100 UNITS SUBCUTANEOUSLY AT BEDTIME OR AS DIRECTED, Disp: 6 Vial, Rfl: 0    losartan (COZAAR) 100 mg tablet, Take 1 Tab by mouth daily. , Disp: 30 Tab, Rfl: 11    atorvastatin (LIPITOR) 40 mg tablet, TAKE TWO TABLETS BY MOUTH ONCE DAILY, Disp: 30 Tab, Rfl: 11    aspirin 81 mg chewable tablet, Take 1 tablet by mouth daily. , Disp: 90 tablet, Rfl: 3    Allergies: Allergies   Allergen Reactions    Lisinopril Cough       Physical Examination:  Blood pressure (!) 170/110, pulse 74, resp. rate 16, height 5' 2\" (1.575 m), weight 180 lb 12.8 oz (82 kg), last menstrual period 02/21/1988, SpO2 99 %. Gen: no acute distress  HEENT: mucous membranes moist, normocephalic, non traumatic  Thyroid: no enlargement or nodules noted  CAD: normal rate, regular rhythm.  No murmur rubs or gallops  PULM: unlabored respirations  GI: soft, non tender, no lipohypertrophy  EXT: no clubbing, cyanosis or edema  Neuro: grossly non focal  Psych: pleasant, good insight into medical hx  Skin: warm, dry        Clinical Data Review:  Lab Results   Component Value Date/Time    Hemoglobin A1c 12.0 03/21/2017 05:00 PM     Lab Results   Component Value Date/Time    Sodium 144 03/21/2017 05:00 PM    Potassium 3.7 03/21/2017 05:00 PM    Chloride 101 03/21/2017 05:00 PM    CO2 22 03/21/2017 05:00 PM    Anion gap 9 06/02/2015 03:30 PM    Glucose 71 03/21/2017 05:00 PM    BUN 12 03/21/2017 05:00 PM    Creatinine 0.87 03/21/2017 05:00 PM    BUN/Creatinine ratio 14 03/21/2017 05:00 PM    GFR est AA 79 03/21/2017 05:00 PM    GFR est non-AA 68 03/21/2017 05:00 PM    Calcium 10.3 03/21/2017 05:00 PM     Lab Results   Component Value Date/Time    Microalb/Creat ratio (ug/mg creat.) 40.6 09/13/2012 08:25 AM    Microalbumin urine (POC) 30 02/21/2013 08:50 AM     Lab Results   Component Value Date/Time    Cholesterol, total 289 10/20/2016 10:08 AM    HDL Cholesterol 57 10/20/2016 10:08 AM    LDL, calculated 197 10/20/2016 10:08 AM    VLDL, calculated 35 10/20/2016 10:08 AM    Triglyceride 173 10/20/2016 10:08 AM       Assessment and Plan:  Patient is a 73 y.o. female here for uncontrolled type 2 diabetes on basal-bolus insulin therapy. She has wide glucose variations and would benefit from using a GLP1a in place of mealtime insulin. Will start Victoza and gradually wean off Humalog as below. We discussed the importance of good blood pressure and glycemic control to further reduce the risks of microvascular and macrovascular complications. We have discussed how to recognize and treat hypoglycemia. She will notify me in between appointments for hypoglycemia or persistent hyperglycemia and has my contact information.      Glycemic Medication Changes:  - start Victoza 0.6-->1.2-->1.8mg daily  - decrease Humalog to 20-->10-->0 as she uptitrates Victoza   - continue Lantus 100 units daily but split into 2 injections of 50 units each  - detailed instructions provided on medication changes  - check BG at least twice a day and bring log or meter to next visit    DM Health Maintenance: pertinent items updated in HM tab  A1c: update today  Cv/Lipids: on atorvastatin 40, lipids UTD  BP/Renal: not at goal, on ARB, microalbumin: update today --> also start chlorthalidone in place of Ziac due to elevated BP  Vaccines: per PCP  Podiatry: no active issues, encouraged well-fitting footwear and daily inspection  Neuro: no evidence of neuropathy  Ophtho: yearly eye exam recommended  Diet and exercise: discussed healthy eating and exercise recommendations, particularly reduction in dietary carbohydrates    I spent 60 minutes with the patient today and > 50% of the time was spent counseling the patient about diabetes management including medication options and dietary modification. Patient verbalized an understanding and will return to clinic in 3 months. Thank you for the opportunity to participate in this patient's care.     Rozina Curry MD  [facility]  [address] [place] [address]

## 2017-08-03 NOTE — PATIENT INSTRUCTIONS
Diabetes Instructions:  - start Victoza according to the instruction below, decrease Humalog depending on the Victoza instructions   - 0.6mg dose: take 20 units of Humalog with meals   - 1.2mg dose: take 10 units of Humalog with meals   - 1.8mg dose: stop taking Humalog  - continue Lantus 100 units at night: split this into two injections of 50 units each  - continue metformin 1000mg twice a day    We will use victoza to help with Hemoglobin A1c (3 month test of blood sugar) and weight loss. Take this once daily every morning or in the evening at the same time each day and it doesn't matter if this is before or after a meal.  Start at the 0.6 mg dose for 1 week. If you are tolerating this without significant nausea, increase to the 1.2 mg dose for 1 week. If still tolerating, go up to 1.8 mg daily. If you are able to tolerate this at the 1.2 mg dose, go ahead and fill the prescription as this may need an authorization. If you can't tolerate the 1.8 mg dose, then stay at the highest dose you can tolerate. Watch out for any severe abdominal pain that goes to the back and is associated with nausea or vomiting as this may be due to pancreatitis which is the most severe but rarest side effect of this medication. Please notify me if this occurs. Check blood sugar at least TWICE a day: every morning when you wake up and at bedtime. Keep a record of your values and bring this or your glucometer with you to your next visit. Diet instructions:  Reduce the amount of carbohydrates in your diet. This means not just avoiding sweets, sodas, or desserts but also reducing the amount of bread, pasta, rice, potatoes, corn, and crackers that you eat. Do not have more than one serving of carbs per meal (for example: do not eat a sandwich and potato chips in the same meal).     Focus on eating mostly protein (meat, poultry, fish, shellfish, eggs), healthy fats (avocados, nuts, cheese, olive or coconut oil) and non-starchy vegetables (greens, carrots, tomatoes, bell peppers, broccoli, brussels sprouts, green beans, etc). If you fill yourself up with these foods, you won't even want the carbs.

## 2017-08-04 LAB
ALBUMIN/CREAT UR: 86.6 MG/G CREAT (ref 0–30)
CREAT UR-MCNC: 96 MG/DL
EST. AVERAGE GLUCOSE BLD GHB EST-MCNC: 298 MG/DL
HBA1C MFR BLD: 12 % (ref 4.8–5.6)
MICROALBUMIN UR-MCNC: 83.1 UG/ML

## 2017-09-08 ENCOUNTER — TELEPHONE (OUTPATIENT)
Dept: FAMILY MEDICINE CLINIC | Age: 72
End: 2017-09-08

## 2017-09-08 DIAGNOSIS — E11.9 TYPE 2 DIABETES MELLITUS WITHOUT COMPLICATION, UNSPECIFIED LONG TERM INSULIN USE STATUS: ICD-10-CM

## 2017-09-08 RX ORDER — ATORVASTATIN CALCIUM 40 MG/1
TABLET, FILM COATED ORAL
Qty: 60 TAB | Refills: 2 | Status: SHIPPED | OUTPATIENT
Start: 2017-09-08 | End: 2018-11-06 | Stop reason: SDUPTHER

## 2017-09-08 NOTE — TELEPHONE ENCOUNTER
Karthik Hansen, phone [phone]    Asking for 60 day instead of 30 day supply as patient taking medication twice daily.     Atorvastatin 40 mg

## 2017-10-05 RX ORDER — INSULIN GLARGINE 100 [IU]/ML
INJECTION, SOLUTION SUBCUTANEOUS
Qty: 1 VIAL | Refills: 3 | Status: SHIPPED | OUTPATIENT
Start: 2017-10-05 | End: 2017-10-27 | Stop reason: SDUPTHER

## 2017-10-26 DIAGNOSIS — I10 ESSENTIAL HYPERTENSION: ICD-10-CM

## 2017-10-27 ENCOUNTER — TELEPHONE (OUTPATIENT)
Dept: FAMILY MEDICINE CLINIC | Age: 72
End: 2017-10-27

## 2017-10-27 RX ORDER — INSULIN GLARGINE 100 [IU]/ML
100 INJECTION, SOLUTION SUBCUTANEOUS DAILY
Qty: 1 VIAL | Refills: 0 | Status: SHIPPED | OUTPATIENT
Start: 2017-10-27 | End: 2017-11-09 | Stop reason: SDUPTHER

## 2017-10-27 RX ORDER — INSULIN GLARGINE 100 [IU]/ML
INJECTION, SOLUTION SUBCUTANEOUS
Qty: 1 VIAL | Refills: 3
Start: 2017-10-27

## 2017-10-27 NOTE — TELEPHONE ENCOUNTER
----- Message from Bran Phillips sent at 10/27/2017  9:21 AM EDT -----  Regarding: Dr. Karen Mcclain / Dusty Bingham Pt's daughter is requesting and emergency refill of the patients \"lantus\" as she is completley Out and best contact number is  [phone] Please send to Pharmacy on File.

## 2017-10-27 NOTE — TELEPHONE ENCOUNTER
Ryan Huron called for update as stating she had previously contacted the pharmacy and they were to have already sent the request.    Informed her the only request for the medication is from her. Can this be address today?

## 2017-10-27 NOTE — TELEPHONE ENCOUNTER
10/27/2017 3:17 PM    Called patient's daughter and verified identity. Per daughter Cordelia Hwang her Mother is completely out of Lantus. Told Tracey I would send a refill for 1 vial to the pharmacy for Lantus but she needed to make an appointment with Dr. Fabián Garza before receiving any more refills. Instructed her to continue the medication as prescribed by Dr. Fabián Graza.      Gian Baumann, DO  Family Medicine Resident, PGY1

## 2017-10-28 RX ORDER — LOSARTAN POTASSIUM 100 MG/1
TABLET ORAL
Qty: 30 TAB | Refills: 9 | Status: SHIPPED | OUTPATIENT
Start: 2017-10-28 | End: 2018-08-03 | Stop reason: SDUPTHER

## 2017-11-09 ENCOUNTER — OFFICE VISIT (OUTPATIENT)
Dept: ENDOCRINOLOGY | Age: 72
End: 2017-11-09

## 2017-11-09 VITALS
BODY MASS INDEX: 34.5 KG/M2 | DIASTOLIC BLOOD PRESSURE: 92 MMHG | SYSTOLIC BLOOD PRESSURE: 148 MMHG | HEIGHT: 62 IN | HEART RATE: 99 BPM | WEIGHT: 187.5 LBS | OXYGEN SATURATION: 98 % | RESPIRATION RATE: 16 BRPM

## 2017-11-09 DIAGNOSIS — E11.9 TYPE 2 DIABETES MELLITUS WITHOUT COMPLICATION, UNSPECIFIED LONG TERM INSULIN USE STATUS: ICD-10-CM

## 2017-11-09 RX ORDER — METFORMIN HYDROCHLORIDE 850 MG/1
850 TABLET ORAL 2 TIMES DAILY WITH MEALS
Qty: 180 TAB | Refills: 3 | Status: SHIPPED | OUTPATIENT
Start: 2017-11-09

## 2017-11-09 RX ORDER — INSULIN GLARGINE 100 [IU]/ML
70 INJECTION, SOLUTION SUBCUTANEOUS DAILY
Qty: 1 VIAL | Refills: 0 | Status: SHIPPED | COMMUNITY
Start: 2017-11-09 | End: 2018-02-08 | Stop reason: SDUPTHER

## 2017-11-09 NOTE — PROGRESS NOTES
Endocrinology Visit    Chief Complaint: Type 2 diabetes    History of Present Illness: Mina Mcarthur is a 81 y.o. female who was referred for evaluation of uncontrolled type 2 diabetes mellitus with last A1c 12% in March. I saw her in initial consultation in August at which time I added Victoza to replace her mealtime insulin. In the interim she reports blood sugars have been somewhat better. Had constipation when starting Victoza but no N/V/D. Current glycemic medication regimen is Victoza 1.8mg daily and Lantus 50 units BID. She was out of Lantus for a few weeks in October. Has been off metformin since March, denies GI s/e from it. Home blood glucose monitoring frequency: three times/day before each meal  Glucose range at home: 151-400s (300-400s when off Lantus, past week since resuming Lantus values have been better in 100s-200s)  The patient has not had hypoglycemia (BG<70). Known complications include microalbuminuria, last Cr and GFR was normal though. Last eye exam was a few months ago, reports cataracts but no dx of diabetic retinopathy. Denies numbness/tingling in her feet. She does have HTN, was previously well controlled on losartan-amlodipine but she was recently switched to something else (Robyn Olive?). At her last visit, I advised her to take chlorthalidone 25mg daily in addition to the losartan - she is tolerating this regimen well. Weight is down about 50 lbs from her max weight in 2013. BMI is 34 but previously was 41. This was not intentional. Since her last visit, she has gained 7 lbs. She is trying to do better with her diet - limiting sweets and starches.     Review of Systems as above, otherwise a 7 pt review is negative    Problem List:  Patient Active Problem List   Diagnosis Code    S/P colonoscopy Z98.890    Microalbuminuria R80.9    S/P hip replacement Z96.649    S/P PIA-BSO Z90.710, Z90.722, Z90.79    Lipid disorder E78.9    Cough due to ACE inhibitor R05, T46.4X5A    DDD (degenerative disc disease), cervical M50.30    Carpal tunnel syndrome on both sides G56.03    Type 2 diabetes mellitus with hyperglycemia, with long-term current use of insulin ([facility]) E11.65, Z79.4    NELIDA on CPAP G47.33, Z99.89    Healthcare maintenance Z00.00    Advance directive discussed with patient Z70.80    Fracture of fibula, distal, right, closed S82.831A    Essential hypertension I10    Obesity (BMI 30-39. 9) E66.9       Past Medical History:    Past Medical History:   Diagnosis Date    Diabetes ([address]     Hypercholesterolemia     Hypertension        Past Surgical History:  Past Surgical History:   Procedure Laterality Date    BREAST SURGERY PROCEDURE UNLISTED      benign breast tumor    HX ANKLE FRACTURE TX Right 06/2015    HX COLONOSCOPY      HX HIP REPLACEMENT Left     HX HYSTERECTOMY         Social History:  Social History     Social History    Marital status:      Spouse name: N/A    Number of children: N/A    Years of education: N/A     Occupational History    Not on file.      Social History Main Topics    Smoking status: Former Smoker     Years: 10.00     Quit date: 6/2/2000    Smokeless tobacco: Never Used      Comment: RARE CIGARETTE WHEN SMOKING    Alcohol use 3.6 oz/week     6 Cans of beer per week      Comment: social / occasional    Drug use: No    Sexual activity: Not Currently     Other Topics Concern    Not on file     Social History Narrative       Family History:  Family History   Problem Relation Age of Onset    Alcohol abuse Father     Cancer Brother     Heart Disease Brother     Arthritis-osteo Neg Hx     Asthma Neg Hx     Bleeding Prob Neg Hx     Diabetes Neg Hx     Elevated Lipids Neg Hx     Headache Neg Hx     Hypertension Neg Hx     Lung Disease Neg Hx     Migraines Neg Hx     Psychiatric Disorder Neg Hx     Stroke Neg Hx     Mental Retardation Neg Hx     Anesth Problems Neg Hx        Medications:     Current Outpatient Prescriptions:   losartan (COZAAR) 100 mg tablet, TAKE ONE TABLET BY MOUTH DAILY, Disp: 30 Tab, Rfl: 9    insulin glargine (LANTUS) 100 unit/mL injection, 100 Units by SubCUTAneous route daily. Continue 50 units two times a day as instructed per Dr. Jessica Howell, Disp: 1 Vial, Rfl: 0    atorvastatin (LIPITOR) 40 mg tablet, TAKE TWO TABLETS BY MOUTH ONCE DAILY, Disp: 60 Tab, Rfl: 2    chlorthalidone (HYGROTEN) 25 mg tablet, Take 1 Tab by mouth daily. , Disp: 30 Tab, Rfl: 5    Insulin Needles, Disposable, ([place] PEN NEEDLE) 32 gauge x 5/32\" ndle, 1 Each by Does Not Apply route daily. , Disp: 100 Pen Needle, Rfl: 3    Liraglutide (VICTOZA) 0.6 mg/0.1 mL (18 mg/3 mL) sub-q pen, Inject 0.6mg --> 1.2 --> 1.8mg daily (uptitrate weekly), Disp: 3 mL, Rfl: 0    aspirin 81 mg chewable tablet, Take 1 tablet by mouth daily. , Disp: 90 tablet, Rfl: 3    metFORMIN (GLUCOPHAGE) 850 mg tablet, TAKE ONE TABLET BY MOUTH three times DAILY WITH MEALS, Disp: 270 Tab, Rfl: 3    HUMALOG 100 unit/mL injection, INJECT 30 UNITS SUBCUTANEOUSLY THREE TIMES A DAY WITH MEALS OR AS DIRECTED, Disp: 3 Vial, Rfl: 3    Allergies: Allergies   Allergen Reactions    Lisinopril Cough       Physical Examination:  Blood pressure (!) 148/92, pulse 99, resp. rate 16, height 5' 2\" (1.575 m), last menstrual period 02/21/1988, SpO2 98 %.     Gen: no acute distress  HEENT: mucous membranes moist  PULM: unlabored respirations  EXT: no clubbing, cyanosis or edema  Neuro: grossly non focal  Psych: pleasant, good insight into medical hx  Skin: warm, dry      Clinical Data Review:  Lab Results   Component Value Date/Time    Hemoglobin A1c 12.0 08/03/2017 10:07 AM     Lab Results   Component Value Date/Time    Sodium 144 03/21/2017 05:00 PM    Potassium 3.7 03/21/2017 05:00 PM    Chloride 101 03/21/2017 05:00 PM    CO2 22 03/21/2017 05:00 PM    Anion gap 9 06/02/2015 03:30 PM    Glucose 71 03/21/2017 05:00 PM    BUN 12 03/21/2017 05:00 PM    Creatinine 0.87 03/21/2017 05:00 PM BUN/Creatinine ratio 14 03/21/2017 05:00 PM    GFR est AA 79 03/21/2017 05:00 PM    GFR est non-AA 68 03/21/2017 05:00 PM    Calcium 10.3 03/21/2017 05:00 PM     Lab Results   Component Value Date/Time    Microalb/Creat ratio (ug/mg creat.) 86.6 08/03/2017 10:07 AM    Microalbumin urine (POC) 30 02/21/2013 08:50 AM     Lab Results   Component Value Date/Time    Cholesterol, total 289 10/20/2016 10:08 AM    HDL Cholesterol 57 10/20/2016 10:08 AM    LDL, calculated 197 10/20/2016 10:08 AM    VLDL, calculated 35 10/20/2016 10:08 AM    Triglyceride 173 10/20/2016 10:08 AM       Assessment and Plan:  Patient is a 81 y.o. female here for type 2 diabetes, previously uncontrolled on basal-bolus insulin therapy with A1c of 12%. Appears glycemic control is improving when she is taking her insulin and Victoza consistently, but she has not been consistent for the past 3 months. Will resume metformin today and decrease her insulin dose to 70 units so she can take it just once a day. I suspect this may help with weight loss. Repeat A1c in 3 months.      Glycemic Medication Changes:  - start metformin 850mg BID (start with 850mg daily x 1 wk, then increase to BID)  - continue Victoza 1.8mg daily  - decrease Lantus to 70 units daily  - detailed instructions provided on medication changes  - check BG at least twice a day and bring log or meter to next visit    DM Health Maintenance: pertinent items updated in HM tab  A1c: update in 3 months before next visit  Cv/Lipids: on atorvastatin 40, lipids UTD  BP/Renal: not at goal but improved after adding chlorthalidone, on ARB, microalbumin: UTD and elevated, GFR normal  Vaccines: per PCP  Podiatry: no active issues, encouraged well-fitting footwear and daily inspection  Neuro: no sx of neuropathy  Ophtho: yearly eye exam recommended  Diet and exercise: discussed healthy eating and exercise recommendations, particularly reduction in dietary carbohydrates    I spent 25 minutes with the patient today and > 50% of the time was spent counseling the patient about diabetes management including medication options and dietary modification. Patient verbalized an understanding and will return to clinic in 3 months. Thank you for the opportunity to participate in this patient's care.     Abiola Martinez MD  [facility]  [facility]

## 2017-11-09 NOTE — MR AVS SNAPSHOT
Visit Information Date & Time Provider Department Dept. Phone Encounter #  
 11/9/2017 10:50 AM Kaylynn Hayden, [address] [facility] Diabetes and Endocrinology [phone] Upcoming Health Maintenance Date Due  
 EYE EXAM RETINAL OR DILATED Q1 3/1/2016 BREAST CANCER SCRN MAMMOGRAM 7/28/2017 Influenza Age 5 to Adult 8/1/2017 FOOT EXAM Q1 10/20/2017 LIPID PANEL Q1 10/20/2017 FOBT Q 1 YEAR AGE 50-75 10/20/2017 MEDICARE YEARLY EXAM 10/21/2017 HEMOGLOBIN A1C Q6M 2/3/2018 MICROALBUMIN Q1 8/3/2018 GLAUCOMA SCREENING Q2Y 10/20/2018 DTaP/Tdap/Td series (2 - Td) 8/21/2024 Allergies as of 11/9/2017  Review Complete On: 11/9/2017 By: Rodrigo Cannon Severity Noted Reaction Type Reactions Lisinopril  01/21/2013    Cough Current Immunizations  Reviewed on 3/21/2017 Name Date Influenza Vaccine (Quad) PF 10/20/2016 Influenza Vaccine Split 10/11/2012 Pneumococcal Polysaccharide (PPSV-23) 1/21/2013 Pneumococcal Vaccine (Pcv) 2/2/2010 Tdap 8/21/2014 Not reviewed this visit You Were Diagnosed With   
  
 Codes Comments Type 2 diabetes mellitus without complication, unspecified long term insulin use status (HCC)     ICD-10-CM: E11.9 ICD-9-CM: 250.00 Vitals BP Pulse Resp Height(growth percentile) Weight(growth percentile) LMP  
 (!) 148/92 99 16 5' 2\" (1.575 m) 187 lb 8 oz (85 kg) 02/21/1988 SpO2 BMI OB Status Smoking Status 98% 34.29 kg/m2 Hysterectomy Former Smoker Vitals History BMI and BSA Data Body Mass Index Body Surface Area  
 34.29 kg/m 2 1.93 m 2 Preferred Pharmacy Pharmacy Name Phone [facility] [address] [phone] Your Updated Medication List  
  
   
This list is accurate as of: 11/9/17 11:23 AM.  Always use your most recent med list.  
  
  
  
  
 aspirin 81 mg chewable tablet Take 1 tablet by mouth daily. atorvastatin 40 mg tablet Commonly known as:  LIPITOR  
TAKE TWO TABLETS BY MOUTH ONCE DAILY  
  
 chlorthalidone 25 mg tablet Commonly known as:  Lugo Dace Take 1 Tab by mouth daily. HumaLOG 100 unit/mL injection Generic drug:  insulin lispro INJECT 30 UNITS SUBCUTANEOUSLY THREE TIMES A DAY WITH MEALS OR AS DIRECTED  
  
 insulin glargine 100 unit/mL injection Commonly known as:  LANTUS  
70 Units by SubCUTAneous route daily. Insulin Needles (Disposable) 30 gauge x 1/3\" by SubCUTAneous route daily. Liraglutide 0.6 mg/0.1 mL (18 mg/3 mL) Pnij Commonly known as:  Asad Bree Inject 0.6mg --> 1.2 --> 1.8mg daily (uptitrate weekly)  
  
 losartan 100 mg tablet Commonly known as:  COZAAR  
TAKE ONE TABLET BY MOUTH DAILY  
  
 metFORMIN 850 mg tablet Commonly known as:  GLUCOPHAGE Take 1 Tab by mouth two (2) times daily (with meals). Prescriptions Sent to Pharmacy Refills  
 metFORMIN (GLUCOPHAGE) 850 mg tablet 3 Sig: Take 1 Tab by mouth two (2) times daily (with meals). Class: Normal  
 Pharmacy: [facility], [address]  Ph #: [phone] Route: Oral  
 Insulin Needles, Disposable, 30 gauge x 1/3\" 11 Sig: by SubCUTAneous route daily. Class: Normal  
 Pharmacy: [facility], [address]  Ph #: [phone] Route: SubCUTAneous Patient Instructions Diabetes Instructions: 
- start metformin tonight: take 850mg once a day (after dinner) for one week --> then increase to twice a day 
- decrease Lantus to 70 units once a day (at night) 
- continue Victoza 1.8mg daily Check blood sugar at least TWICE a day: every morning when you wake up and at bedtime. Keep a record of your values and bring this or your glucometer with you to your next visit. Diet instructions: 
Reduce the amount of carbohydrates in your diet.  This means not just avoiding sweets, sodas, or desserts but also reducing the amount of bread, pasta, rice, potatoes, corn, and crackers that you eat. Do not have more than one serving of carbs per meal (for example: do not eat a sandwich and potato chips in the same meal). Focus on eating mostly protein (meat, poultry, fish, shellfish, eggs), healthy fats (avocados, nuts, cheese, olive or coconut oil) and non-starchy vegetables (greens, carrots, tomatoes, bell peppers, broccoli, brussels sprouts, green beans, etc). If you fill yourself up with these foods, you won't even want the carbs. Introducing [facility] & HE[facility] SERVICES! Trevor Sam introduces [employer] patient portal. Now you can access parts of your medical record, email your doctor's office, and request medication refills online. 1. In your internet browser, go to [email or web address] 2. Click on the First Time User? Click Here link in the Sign In box. You will see the New Member Sign Up page. 3. Enter your Ready[employer] Access Code exactly as it appears below. You will not need to use this code after youve completed the sign-up process. If you do not sign up before the expiration date, you must request a new code. · [employer] Access Code: [email or web address] Expires: 2/7/2018 11:18 AM 
 
4. Enter the last four digits of your Social Security Number (xxxx) and Date of Birth (mm/dd/yyyy) as indicated and click Submit. You will be taken to the next sign-up page. 5. Create a StrikeAdt ID. This will be your Readyforce login ID and cannot be changed, so think of one that is secure and easy to remember. 6. Create a StrikeAdt password. You can change your password at any time. 7. Enter your Password Reset Question and Answer. This can be used at a later time if you forget your password. 8. Enter your e-mail address. You will receive e-mail notification when new information is available in [address] 9. Click Sign Up.  You can now view and download portions of your medical record. 10. Click the Download Summary menu link to download a portable copy of your medical information. If you have questions, please visit the Frequently Asked Questions section of the Lucky Sort website. Remember, Lucky Sort is NOT to be used for urgent needs. For medical emergencies, dial 911. Now available from your iPhone and Android! Please provide this summary of care documentation to your next provider. Your primary care clinician is listed as Nghia Galloway. If you have any questions after today's visit, please call [phone].

## 2017-11-09 NOTE — PATIENT INSTRUCTIONS
Diabetes Instructions:  - start metformin tonight: take 850mg once a day (after dinner) for one week --> then increase to twice a day  - decrease Lantus to 70 units once a day (at night)  - continue Victoza 1.8mg daily    Check blood sugar at least TWICE a day: every morning when you wake up and at bedtime. Keep a record of your values and bring this or your glucometer with you to your next visit. Diet instructions:  Reduce the amount of carbohydrates in your diet. This means not just avoiding sweets, sodas, or desserts but also reducing the amount of bread, pasta, rice, potatoes, corn, and crackers that you eat. Do not have more than one serving of carbs per meal (for example: do not eat a sandwich and potato chips in the same meal). Focus on eating mostly protein (meat, poultry, fish, shellfish, eggs), healthy fats (avocados, nuts, cheese, olive or coconut oil) and non-starchy vegetables (greens, carrots, tomatoes, bell peppers, broccoli, brussels sprouts, green beans, etc). If you fill yourself up with these foods, you won't even want the carbs.

## 2017-12-28 ENCOUNTER — HOSPITAL ENCOUNTER (OUTPATIENT)
Dept: MAMMOGRAPHY | Age: 72
Discharge: HOME | End: 2017-12-28
Payer: MEDICARE

## 2017-12-28 DIAGNOSIS — Z12.31 VISIT FOR SCREENING MAMMOGRAM: ICD-10-CM

## 2017-12-28 PROCEDURE — 77067 SCR MAMMO BI INCL CAD: CPT

## 2018-02-08 ENCOUNTER — OFFICE VISIT (OUTPATIENT)
Dept: ENDOCRINOLOGY | Age: 73
End: 2018-02-08

## 2018-02-08 VITALS
OXYGEN SATURATION: 98 % | HEART RATE: 113 BPM | BODY MASS INDEX: 33.68 KG/M2 | SYSTOLIC BLOOD PRESSURE: 117 MMHG | DIASTOLIC BLOOD PRESSURE: 87 MMHG | HEIGHT: 62 IN | RESPIRATION RATE: 16 BRPM | WEIGHT: 183 LBS

## 2018-02-08 DIAGNOSIS — Z79.4 TYPE 2 DIABETES MELLITUS WITH HYPERGLYCEMIA, WITH LONG-TERM CURRENT USE OF INSULIN (HCC): Primary | ICD-10-CM

## 2018-02-08 DIAGNOSIS — I10 ESSENTIAL HYPERTENSION: ICD-10-CM

## 2018-02-08 DIAGNOSIS — E66.9 OBESITY (BMI 30-39.9): ICD-10-CM

## 2018-02-08 DIAGNOSIS — E11.65 TYPE 2 DIABETES MELLITUS WITH HYPERGLYCEMIA, WITH LONG-TERM CURRENT USE OF INSULIN (HCC): Primary | ICD-10-CM

## 2018-02-08 LAB — GLUCOSE POC: 181 MG/DL

## 2018-02-08 RX ORDER — INSULIN GLARGINE 100 [IU]/ML
70 INJECTION, SOLUTION SUBCUTANEOUS DAILY
Qty: 1 VIAL | Refills: 11 | Status: SHIPPED | OUTPATIENT
Start: 2018-02-08 | End: 2018-04-12 | Stop reason: SDUPTHER

## 2018-02-08 NOTE — MR AVS SNAPSHOT
[address] [place] [address] 
[phone] Patient: Brigid Escamilla MRN: [id number] :1945 Visit Information Date & Time Provider Department Dept. Phone Encounter #  
 2018 12:10 PM Teja Vargas, [address] [facility] Diabetes and Endocrinology [phone] Follow-up Instructions Return in about 4 months (around 2018). Upcoming Health Maintenance Date Due  
 EYE EXAM RETINAL OR DILATED Q1 3/1/2016 Influenza Age 5 to Adult 2017 FOOT EXAM Q1 10/20/2017 LIPID PANEL Q1 10/20/2017 FOBT Q 1 YEAR AGE 50-75 10/20/2017 MEDICARE YEARLY EXAM 10/21/2017 HEMOGLOBIN A1C Q6M 2/3/2018 MICROALBUMIN Q1 8/3/2018 GLAUCOMA SCREENING Q2Y 10/20/2018 BREAST CANCER SCRN MAMMOGRAM 2019 DTaP/Tdap/Td series (2 - Td) 2024 Allergies as of 2018  Review Complete On: 2018 By: Theresa Webber Severity Noted Reaction Type Reactions Lisinopril  2013    Cough Current Immunizations  Reviewed on 3/21/2017 Name Date Influenza Vaccine (Quad) PF 10/20/2016 Influenza Vaccine Split 10/11/2012 Pneumococcal Polysaccharide (PPSV-23) 2013 Pneumococcal Vaccine (Pcv) 2010 Tdap 2014 Not reviewed this visit You Were Diagnosed With   
  
 Codes Comments Type 2 diabetes mellitus with hyperglycemia, with long-term current use of insulin (HCC)    -  Primary ICD-10-CM: E11.65, Z79.4 ICD-9-CM: 250.00, 790.29, V58.67 Essential hypertension     ICD-10-CM: I10 
ICD-9-CM: 401.9 Obesity (BMI 30-39. 9)     ICD-10-CM: E66.9 ICD-9-CM: 278.00 Vitals BP Pulse Resp Height(growth percentile) Weight(growth percentile) LMP  
 117/87 (!) 113 16 5' 2\" (1.575 m) 183 lb (83 kg) 1988 SpO2 BMI OB Status Smoking Status 98% 33.47 kg/m2 Hysterectomy Former Smoker Vitals History BMI and BSA Data Body Mass Index Body Surface Area  
 33.47 kg/m 2 1.91 m 2 Preferred Pharmacy Pharmacy Name Phone [facility] [phone] Your Updated Medication List  
  
   
This list is accurate as of: 2/8/18 12:35 PM.  Always use your most recent med list.  
  
  
  
  
 aspirin 81 mg chewable tablet Take 1 tablet by mouth daily. atorvastatin 40 mg tablet Commonly known as:  LIPITOR  
TAKE TWO TABLETS BY MOUTH ONCE DAILY  
  
 chlorthalidone 25 mg tablet Commonly known as:  Chen Shane Take 1 Tab by mouth daily. insulin glargine 100 unit/mL injection Commonly known as:  LANTUS  
70 Units by SubCUTAneous route daily. Insulin Needles (Disposable) 30 gauge x 1/3\" by SubCUTAneous route daily. Liraglutide 0.6 mg/0.1 mL (18 mg/3 mL) Pnij Commonly known as:  Cathye Sand Inject 0.6mg --> 1.2 --> 1.8mg daily (uptitrate weekly)  
  
 losartan 100 mg tablet Commonly known as:  COZAAR  
TAKE ONE TABLET BY MOUTH DAILY  
  
 metFORMIN 850 mg tablet Commonly known as:  GLUCOPHAGE Take 1 Tab by mouth two (2) times daily (with meals). We Performed the Following AMB POC GLUCOSE, QUANTITATIVE, BLOOD [68897 CPT(R)] BASIC METABOLIC PANEL (7) [13566 CPT(R)] HEMOGLOBIN A1C WITH EAG [93092 CPT(R)]  DIABETES EYE EXAM [6 Custom]  DIABETES FOOT EXAM [7 Custom] LIPID PANEL [20374 CPT(R)] MICROALBUMIN, UR, 24 HR [id number] CPT(R)] Follow-up Instructions Return in about 4 months (around 6/8/2018). Introducing [facility] & [facility]! Travon Gardner introduces A[employer]i patient portal. Now you can access parts of your medical record, email your doctor's office, and request medication refills online. 1. In your internet browser, go to [email or web address] 2. Click on the First Time User? Click Here link in the Sign In box. You will see the New Member Sign Up page. 3. Enter your [employer] Switch [employer] Access Code exactly as it appears below. You will not need to use this code after youve completed the sign-up process. If you do not sign up before the expiration date, you must request a new code. · [employer] Access Code: [email or web address] Expires: 5/9/2018 11:53 AM 
 
4. Enter the last four digits of your Social Security Number (xxxx) and Date of Birth (mm/dd/yyyy) as indicated and click Submit. You will be taken to the next sign-up page. 5. Create a SnapLayoutt ID. This will be your [employer] login ID and cannot be changed, so think of one that is secure and easy to remember. 6. Create a [employer] password. You can change your password at any time. 7. Enter your Password Reset Question and Answer. This can be used at a later time if you forget your password. 8. Enter your e-mail address. You will receive e-mail notification when new information is available in [address] 9. Click Sign Up. You can now view and download portions of your medical record. 10. Click the Download Summary menu link to download a portable copy of your medical information. If you have questions, please visit the Frequently Asked Questions section of the [employer] website. Remember, [employer] is NOT to be used for urgent needs. For medical emergencies, dial 911. Now available from your iPhone and Android! Please provide this summary of care documentation to your next provider. Your primary care clinician is listed as Lexis Garcia. If you have any questions after today's visit, please call [phone].

## 2018-02-08 NOTE — PROGRESS NOTES
Endocrinology Visit    Chief Complaint: Type 2 diabetes    History of Present Illness: Keara Moss is a 81 y.o. female who returns for uncontrolled type 2 diabetes mellitus with last A1c 12% in August. I saw her in initial consultation in August 2017 at which time I added Victoza to replace her mealtime insulin. At her follow up in November, I added metformin since blood sugars were still intermittently above goal. She reports that her sugars have been much better since and she has lost about 5 lbs, down 16 lbs in the past year. Overall has been feeling well until this morning - developed N/V/D. Denies fevers/chills or URI sx. Current glycemic medication regimen is Victoza 1.8mg daily, metformin 850mg BID, and Lantus 70 units daily. Home blood glucose monitoring frequency: 1-2 times/day  Glucose range at home: 140-175 (200s this morning because she is sick),  in clinic today  The patient has not had hypoglycemia (BG<70). Known complications include microalbuminuria, Cr and GFR have been normal though. Last eye exam was a few months ago, reports cataracts but no dx of diabetic retinopathy. Denies numbness/tingling in her feet. She does have HTN- I added chlorthalidone 25mg daily in addition to the losartan in August and she is tolerating this regimen well.      Review of Systems as above, otherwise a 7 pt review is negative    Problem List:  Patient Active Problem List   Diagnosis Code    S/P colonoscopy Z98.890    Microalbuminuria R80.9    S/P hip replacement Z96.649    S/P PIA-BSO Z90.710, Z90.722, Z90.79    Lipid disorder E78.9    Cough due to ACE inhibitor R05, T46.4X5A    DDD (degenerative disc disease), cervical M50.30    Carpal tunnel syndrome on both sides G56.03    Type 2 diabetes mellitus with hyperglycemia, with long-term current use of insulin (HCC) E11.65, Z79.4    NELIDA on CPAP G47.33, Z99.89    Healthcare maintenance Z00.00    Advance directive discussed with patient Z70.80  Fracture of fibula, distal, right, closed S82.831A    Essential hypertension I10    Obesity (BMI 30-39. 9) E66.9       Past Medical History:    Past Medical History:   Diagnosis Date    Diabetes ([address]     Hypercholesterolemia     Hypertension        Past Surgical History:  Past Surgical History:   Procedure Laterality Date    BREAST SURGERY PROCEDURE UNLISTED      benign breast tumor    HX ANKLE FRACTURE TX Right 06/2015    HX COLONOSCOPY      HX HIP REPLACEMENT Left     HX HYSTERECTOMY         Social History:  Social History     Social History    Marital status:      Spouse name: N/A    Number of children: N/A    Years of education: N/A     Occupational History    Not on file. Social History Main Topics    Smoking status: Former Smoker     Years: 10.00     Quit date: 6/2/2000    Smokeless tobacco: Never Used      Comment: RARE CIGARETTE WHEN SMOKING    Alcohol use 3.6 oz/week     6 Cans of beer per week      Comment: social / occasional    Drug use: No    Sexual activity: Not Currently     Other Topics Concern    Not on file     Social History Narrative       Family History:  Family History   Problem Relation Age of Onset    Alcohol abuse Father     Cancer Brother     Heart Disease Brother     Arthritis-osteo Neg Hx     Asthma Neg Hx     Bleeding Prob Neg Hx     Diabetes Neg Hx     Elevated Lipids Neg Hx     Headache Neg Hx     Hypertension Neg Hx     Lung Disease Neg Hx     Migraines Neg Hx     Psychiatric Disorder Neg Hx     Stroke Neg Hx     Mental Retardation Neg Hx     Anesth Problems Neg Hx        Medications:     Current Outpatient Prescriptions:     metFORMIN (GLUCOPHAGE) 850 mg tablet, Take 1 Tab by mouth two (2) times daily (with meals). , Disp: 180 Tab, Rfl: 3    Insulin Needles, Disposable, 30 gauge x 1/3\", by SubCUTAneous route daily. , Disp: 1 Package, Rfl: 11    insulin glargine (LANTUS) 100 unit/mL injection, 70 Units by SubCUTAneous route daily. , Disp: 1 Vial, Rfl: 0    losartan (COZAAR) 100 mg tablet, TAKE ONE TABLET BY MOUTH DAILY, Disp: 30 Tab, Rfl: 9    atorvastatin (LIPITOR) 40 mg tablet, TAKE TWO TABLETS BY MOUTH ONCE DAILY, Disp: 60 Tab, Rfl: 2    chlorthalidone (HYGROTEN) 25 mg tablet, Take 1 Tab by mouth daily. , Disp: 30 Tab, Rfl: 5    Liraglutide (VICTOZA) 0.6 mg/0.1 mL (18 mg/3 mL) sub-q pen, Inject 0.6mg --> 1.2 --> 1.8mg daily (uptitrate weekly), Disp: 3 mL, Rfl: 0    aspirin 81 mg chewable tablet, Take 1 tablet by mouth daily. , Disp: 90 tablet, Rfl: 3    Allergies: Allergies   Allergen Reactions    Lisinopril Cough       Physical Examination:  Blood pressure 117/87, pulse (!) 113, resp. rate 16, height 5' 2\" (1.575 m), weight 183 lb (83 kg), last menstrual period 02/21/1988, SpO2 98 %.     Gen: no acute distress  HEENT: mucous membranes slightly dry  CV: normal rate ~90, regular rhythm  PULM: CTAB, no w/r/r  EXT: nonpitting edema BL  Neuro: grossly non focal  Psych: pleasant, good insight into medical hx  Skin: warm, dry      Clinical Data Review:  Lab Results   Component Value Date/Time    Hemoglobin A1c 12.0 (H) 08/03/2017 10:07 AM     Lab Results   Component Value Date/Time    Sodium 144 03/21/2017 05:00 PM    Potassium 3.7 03/21/2017 05:00 PM    Chloride 101 03/21/2017 05:00 PM    CO2 22 03/21/2017 05:00 PM    Anion gap 9 06/02/2015 03:30 PM    Glucose 71 03/21/2017 05:00 PM    BUN 12 03/21/2017 05:00 PM    Creatinine 0.87 03/21/2017 05:00 PM    BUN/Creatinine ratio 14 03/21/2017 05:00 PM    GFR est AA 79 03/21/2017 05:00 PM    GFR est non-AA 68 03/21/2017 05:00 PM    Calcium 10.3 03/21/2017 05:00 PM     Lab Results   Component Value Date/Time    Microalb/Creat ratio (ug/mg creat.) 86.6 (H) 08/03/2017 10:07 AM    Microalbumin urine (POC) 30 02/21/2013 08:50 AM     Lab Results   Component Value Date/Time    Cholesterol, total 289 (H) 10/20/2016 10:08 AM    HDL Cholesterol 57 10/20/2016 10:08 AM    LDL, calculated 197 (H) 10/20/2016 10:08 AM    VLDL, calculated 35 10/20/2016 10:08 AM    Triglyceride 173 (H) 10/20/2016 10:08 AM       Assessment and Plan:  Patient is a 81 y.o. female here for type 2 diabetes, previously uncontrolled on basal-bolus insulin therapy with A1c of 12%. Based on self-reported home glucose readings, appears glycemic control is improving on regimen of GLP1a, metformin, and basal insulin. Will check A1c today to confirm. Glycemic Medication Changes:  - continue metformin 850mg BID   - continue Victoza 1.8mg daily  - continue Lantus 70 units daily  - check BG at least twice a day and bring log or meter to next visit    DM Health Maintenance: pertinent items updated in HM tab  A1c: update today  Cv/Lipids: on atorvastatin 40, update lipids  BP/Renal: at goal (improved after adding chlorthalidone), on ARB, microalbumin: UTD and elevated, GFR normal  Vaccines: per PCP  Podiatry: no active issues, encouraged well-fitting footwear and daily inspection  Neuro: no sx of neuropathy  Ophtho: yearly eye exam recommended  Diet and exercise: discussed healthy eating and exercise recommendations, particularly reduction in dietary carbohydrates    I spent 25 minutes with the patient today and > 50% of the time was spent counseling the patient about diabetes management including medication options and dietary modification. Patient verbalized an understanding and will return to clinic in 4 months. Thank you for the opportunity to participate in this patient's care.     Steffany Dyson MD  [facility]  [address] [place] [address]

## 2018-02-08 NOTE — PROGRESS NOTES
Lab Results   Component Value Date/Time    Hemoglobin A1c 12.0 (H) 08/03/2017 10:07 AM    Hemoglobin A1c 12.0 (H) 03/21/2017 05:00 PM    Hemoglobin A1c 9.6 (H) 10/20/2016 10:08 AM

## 2018-07-11 ENCOUNTER — TELEPHONE (OUTPATIENT)
Dept: ENDOCRINOLOGY | Age: 73
End: 2018-07-11

## 2018-07-11 NOTE — TELEPHONE ENCOUNTER
----- Message from [facility] sent at 7/11/2018  2:02 PM EDT -----  Regarding: Dr. Norma Esparza  Pt [phone] is looking for paperworlk that she needed to have filled out.

## 2018-08-03 DIAGNOSIS — I10 ESSENTIAL HYPERTENSION: ICD-10-CM

## 2018-08-03 RX ORDER — LOSARTAN POTASSIUM 100 MG/1
TABLET ORAL
Qty: 30 TAB | Refills: 3 | Status: SHIPPED | OUTPATIENT
Start: 2018-08-03 | End: 2019-11-03 | Stop reason: SDUPTHER

## 2018-08-03 RX ORDER — ATORVASTATIN CALCIUM 40 MG/1
TABLET, FILM COATED ORAL
Qty: 60 TAB | Refills: 2 | Status: CANCELLED | OUTPATIENT
Start: 2018-08-03

## 2018-08-03 RX ORDER — CHLORTHALIDONE 25 MG/1
25 TABLET ORAL DAILY
Qty: 30 TAB | Refills: 5 | Status: CANCELLED | OUTPATIENT
Start: 2018-08-03

## 2018-08-03 NOTE — TELEPHONE ENCOUNTER
----- Message from Nicholas Augustine sent at 8/3/2018  9:50 AM EDT -----  Regarding: /Refill  Pt called requesting refills on all medications. Pt use the [facility] pharmacy on [facility]. Pt best contact number is [phone].

## 2019-07-06 DIAGNOSIS — E11.9 TYPE 2 DIABETES MELLITUS WITHOUT COMPLICATION, UNSPECIFIED WHETHER LONG TERM INSULIN USE (HCC): ICD-10-CM

## 2019-07-08 RX ORDER — ATORVASTATIN CALCIUM 40 MG/1
TABLET, FILM COATED ORAL
Qty: 60 TAB | Refills: 1 | OUTPATIENT
Start: 2019-07-08

## 2019-07-09 DIAGNOSIS — E11.9 TYPE 2 DIABETES MELLITUS WITHOUT COMPLICATION, UNSPECIFIED WHETHER LONG TERM INSULIN USE (HCC): ICD-10-CM

## 2019-07-09 RX ORDER — ATORVASTATIN CALCIUM 40 MG/1
TABLET, FILM COATED ORAL
Qty: 60 TAB | Refills: 1 | OUTPATIENT
Start: 2019-07-09

## 2019-07-09 NOTE — TELEPHONE ENCOUNTER
Called pharmacy, informed them that patient needs an appointment, patient has not been seen in 2 years.

## 2019-07-11 ENCOUNTER — OFFICE VISIT (OUTPATIENT)
Dept: FAMILY MEDICINE CLINIC | Age: 74
End: 2019-07-11

## 2019-07-11 VITALS
RESPIRATION RATE: 18 BRPM | HEIGHT: 62 IN | BODY MASS INDEX: 31.1 KG/M2 | HEART RATE: 98 BPM | DIASTOLIC BLOOD PRESSURE: 81 MMHG | OXYGEN SATURATION: 96 % | WEIGHT: 169 LBS | TEMPERATURE: 98.2 F | SYSTOLIC BLOOD PRESSURE: 146 MMHG

## 2019-07-11 DIAGNOSIS — E78.5 HYPERLIPIDEMIA, UNSPECIFIED HYPERLIPIDEMIA TYPE: ICD-10-CM

## 2019-07-11 DIAGNOSIS — Z13.31 DEPRESSION SCREENING: ICD-10-CM

## 2019-07-11 DIAGNOSIS — Z12.39 BREAST CANCER SCREENING: ICD-10-CM

## 2019-07-11 DIAGNOSIS — R63.4 WEIGHT LOSS: ICD-10-CM

## 2019-07-11 DIAGNOSIS — Z00.00 MEDICARE ANNUAL WELLNESS VISIT, SUBSEQUENT: Primary | ICD-10-CM

## 2019-07-11 DIAGNOSIS — E11.65 TYPE 2 DIABETES MELLITUS WITH HYPERGLYCEMIA, WITH LONG-TERM CURRENT USE OF INSULIN (HCC): ICD-10-CM

## 2019-07-11 DIAGNOSIS — Z79.4 TYPE 2 DIABETES MELLITUS WITH HYPERGLYCEMIA, WITH LONG-TERM CURRENT USE OF INSULIN (HCC): ICD-10-CM

## 2019-07-11 NOTE — PROGRESS NOTES
Chief Complaint   Patient presents with   [facility] Wellness Visit

## 2019-07-11 NOTE — PROGRESS NOTES
[address]  [id number] [facility] [address]   [phone]             Date of visit: 7/11/2019       This is an Subsequent Medicare Annual Wellness Visit (AWV), (Performed more than 12 months after effective date of Medicare Part B enrollment and 12 months after last preventive visit, Once in a lifetime)    I have reviewed the patient's medical history in detail and updated the computerized patient record. Jen Suarez is a 72 y.o. female   History obtained from: the patient. Concerns today   (Patient understands that medical problems addressed today may incur additional cost as this is a preventive visit)    Pt has not had any follow up with our office since 2017. Also shares that he endocrinologist moved aware and she had not followed up since 2/2018. She shares she has taken all medications as prescribed. Has not missed any dose. Tolerating well. No hypoglycemic episodes. Fasting range 120-140. BG she states was 112 this morning. Has appt with new endocrinologist in 5 days ago. Last eye exam was a 5/17/19, reports cataracts and glaucoma but no dx of diabetic retinopathy. Denies numbness/tingling in her feet. Pt also concerned about weight loss. Unintentional weight loss. Started after adding victoza. She has not night sweats or fevers. No exercising. No blood in stool. No vaginal bleeding. States colonoscopy and mammograms UTD. No cancer in family hx.      History     Patient Active Problem List   Diagnosis Code    S/P colonoscopy Z98.890    Microalbuminuria R80.9    S/P hip replacement Z96.649    S/P PIA-BSO Z90.710, Z90.722, Z90.79    Lipid disorder E78.9    Cough due to ACE inhibitor R05, T46.4X5A    DDD (degenerative disc disease), cervical M50.30    Carpal tunnel syndrome on both sides G56.03    Type 2 diabetes mellitus with hyperglycemia, with long-term current use of insulin (HCC) E11.65, Z79.4    NELIDA on CPAP G47.33, Z99.89 [address] maintenance Z00.00    Advance directive discussed with patient Z70.80    Fracture of fibula, distal, right, closed S82.831A    Essential hypertension I10    Obesity (BMI 30-39. 9) E66.9     Past Medical History:   Diagnosis Date    Diabetes ([address]     Hypercholesterolemia     Hypertension       Past Surgical History:   Procedure Laterality Date    BREAST SURGERY PROCEDURE UNLISTED      benign breast tumor    HX ANKLE FRACTURE TX Right 2015    HX COLONOSCOPY      HX HIP REPLACEMENT Left     HX HYSTERECTOMY       Allergies   Allergen Reactions    Lisinopril Cough     Current Outpatient Medications   Medication Sig Dispense Refill    LANTUS U-100 INSULIN 100 unit/mL injection INJECT 70 UNITS UNDER THE SKIN DAILY 30 mL 1    atorvastatin (LIPITOR) 40 mg tablet TAKE TWO TABLETS BY MOUTH DAILY 60 Tab 1    Liraglutide (VICTOZA) 0.6 mg/0.1 mL (18 mg/3 mL) pnij Inject 1.8mg daily 3 Pen 3    losartan (COZAAR) 100 mg tablet TAKE ONE TABLET BY MOUTH DAILY 30 Tab 3    metFORMIN (GLUCOPHAGE) 850 mg tablet Take 1 Tab by mouth two (2) times daily (with meals). 180 Tab 3    Insulin Needles, Disposable, 30 gauge x 1/3\" by SubCUTAneous route daily. 1 Package 11    chlorthalidone (HYGROTEN) 25 mg tablet Take 1 Tab by mouth daily. 30 Tab 5    aspirin 81 mg chewable tablet Take 1 tablet by mouth daily. 90 tablet 3     Social History     Tobacco Use    Smoking status: Former Smoker     Years: 10.00     Last attempt to quit: 2000     Years since quittin.1    Smokeless tobacco: Never Used    Tobacco comment: RARE CIGARETTE WHEN SMOKING   Substance Use Topics    Alcohol use: Yes     Alcohol/week: 3.6 oz     Types: 6 Cans of beer per week     Comment: social / occasional       Specialists/Care Team   Francis Goodman.  Armida Severino has established care with the following healthcare providers:  Patient Care Team:  Shyann Ny DO as PCP - General (Family Practice)  Ifeanyi Montelongo [address] [facility] [address]     Demographics   female  72 y.o. General Health Questions   -During the past 4 weeks:              -how would you rate your health in general? good              -how often have you been bothered by feeling dizzy when standing up? No              -how much have you been bothered by bodily pain? not              -Have you noticed any hearing difficulties? no              -has your physical and emotional health limited your social activities with family or friends? no     Emotional Health Questions   -Do you have a history of depression, anxiety, or emotional problems? no  -Over the past 2 weeks, have you felt down, depressed or hopeless? no  -Over the past 2 weeks, have you felt little interest or pleasure in doing things? no     Health Habits   Please describe your diet habits: Balanced  Do you get 5 servings of fruits or vegetables daily? no  Do you exercise regularly? no     Activities of Daily Living and Functional Status   -Do you need help with eating, walking, dressing, bathing, toileting, the phone, transportation, shopping, preparing meals, housework, laundry, medications or managing money? no  -In the past four weeks, was someone available to help you if you needed and wanted help with anything? no  -Are you confident are you that you can control and manage most of your health problems? yes  -Have you been given information to help you keep track of your medications? yes  -How often do you have trouble taking your medications as prescribed? never     Fall Risk and Home Safety   Have you fallen 2 or more times in the past year? no  Does your home have rugs in the hallways? no, Do you have grab bars in the bathrooms?  no, Does your home have handrails on the stairs? yes, Do you have adequate lighting in your home? no  Do you have smoke detectors and check them regularly?  yes  Do you have difficulties driving a car/vehicle? no  Do you always fasten your seat belt when you are in a car? yes      Review of Systems (if indicated for problems addressed today)     Constitutional: Negative for activity change, appetite change, chills and fever. HENT: Negative for congestion and sore throat.    Respiratory: Negative for cough and shortness of breath.    Cardiovascular: Negative for chest pain. Gastrointestinal: Negative for abdominal pain, diarrhea, nausea and vomiting. Genitourinary: Negative for dysuria. Musculoskeletal: Negative for arthralgias. Skin: Negative for color change. Neurological: Negative for dizziness. Psychiatric/Behavioral: The patient is not nervous/anxious. Physical Examination     Vitals:    07/11/19 1058 07/11/19 1100   BP: (!) 159/91 146/81   Pulse: 98    Resp: 18    Temp: 98.2 °F (36.8 °C)    SpO2: 96%    Weight: 169 lb (76.7 kg)    Height: 5' 2\" (1.575 m)      Body mass index is 30.91 kg/m². No exam data present  Was the patient's timed Up & Go test unsteady or longer than 30 seconds? no    Evaluation of Cognitive Function   Mood/affect:  happy  Orientation: Person, Place, Time and Situation  Appearance: age appropriate      Additional exam if indicated for problems addressed today:  Constitutional: oriented to person, place, and time. appears well-developed and well-nourished. Neck: Normal range of motion. Neck supple. No tracheal deviation present. No thyromegaly present. Cardiovascular: Normal rate, regular rhythm, normal heart sounds and intact distal pulses.   No murmur heard. Pulmonary/Chest: Effort normal and breath sounds normal. No respiratory distress. no wheezes. no rales. Abdominal: Soft. Bowel sounds are normal. no distension. There is no tenderness. There is no rebound and no guarding. Neurological: alert and oriented to person, place, and time. No cranial nerve deficit. Coordination normal.   Skin: Skin is warm. No rash noted. No erythema. Psychiatric: normal mood and affect.  behavior is normal. Judgment and thought content normal.     Preventive Services     Discussed today Done Previously Not Needed    x   PNA vaccine   X not interested   Flu vaccine     x Hepatitis B vaccine (if at risk)   X not interested   Shingles vaccine    X 2014  TDAP vaccine    X 2017 Negative  Mammogram     x Pap smear   X 5 years ago, Dr. Kathleen Becerra 2016   Colorectal cancer screening   X not interested at this point   Low-dose CT for lung cancer screening    x  Bone density test   X seen 5/17/19. Dx with glaucoma will see next week Dr. Denzel Bean   Glaucoma screening   x   Cholesterol test   x   Diabetes screening test    X not interested   Diabetes self-management class   X not interested   Nutritionist referral for diabetes or renal disease     Last pap smear 10 years ago. All nml    Discussion of Advance Directive   Discussed with Steve Rodriges. Naomi Lamar her ability to prepare and advance directive in the case that an injury or illness causes her to be unable to make health care decisions. Paperwork given    Assessment/Plan       ICD-10-CM ICD-9-CM    1. Medicare annual wellness visit, subsequent Z00.00 V70.0    2. Weight loss W14.3 137.58 METABOLIC PANEL, COMPREHENSIVE      TSH 3RD GENERATION      CBC W/O DIFF   3. Hyperlipidemia, unspecified hyperlipidemia type E78.5 272.4 LIPID PANEL   4. Depression screening Z13.31 V79.0 [place] DEPRESSION SCREEN ANNUAL   5. Breast cancer screening Z12.31 V76.10 DOROTHY MAMMO BI SCREENING INCL CAD   6. Type 2 diabetes mellitus with hyperglycemia, with long-term current use of insulin (HCC) E11.65 250.00 HEMOGLOBIN A1C WITH EAG    Z79.4 790.29 AMB POC URINE, MICROALBUMIN, SEMIQUANT (3 RESULTS)     V58.67      Reviewed medical records    No refills needed. Pt continue all medications. Will obtain labs. Pt will take copy to endo on Monday. Pt refused me to change any medication until she sees endocrinology, she accepts risks. I am not sure if we will see improvement since she has not been followed over a year.      Will follow-up with me in a week to review labs.  Will also follow-up weight loss    Discussed with attending    Precautions given    Orders Placed This Encounter    DOROTHY MAMMO BI SCREENING INCL CAD    METABOLIC PANEL, COMPREHENSIVE    LIPID PANEL    HEMOGLOBIN A1C WITH EAG    TSH 3RD GENERATION    CBC W/O DIFF    CVD REPORT    DIABETES PATIENT EDUCATION    AMB POC URINE, MICROALBUMIN, SEMIQUANT (3 RESULTS)    MA DEPRESSION SCREEN ANNUAL           [employer], DO

## 2019-07-11 NOTE — PROGRESS NOTES
72year old female here for Medicare Wellness visit    Has DM -- last HgbA1C = 12 two years ago    States has been taking her medications despite not being seen here in 2 years    States that she has unintentional weight loss -- however has been on victoza    Will return in one week to review labs done today    I reviewed with the resident the medical history and the resident's findings on the physical examination. I discussed with the resident the patient's diagnosis and concur with the plan.

## 2019-07-12 LAB
ALBUMIN SERPL-MCNC: 4.3 G/DL (ref 3.5–4.8)
ALBUMIN/GLOB SERPL: 2 {RATIO} (ref 1.2–2.2)
ALP SERPL-CCNC: 75 IU/L (ref 39–117)
ALT SERPL-CCNC: 11 IU/L (ref 0–32)
AST SERPL-CCNC: 12 IU/L (ref 0–40)
BILIRUB SERPL-MCNC: 0.4 MG/DL (ref 0–1.2)
BUN SERPL-MCNC: 10 MG/DL (ref 8–27)
BUN/CREAT SERPL: 12 (ref 12–28)
CALCIUM SERPL-MCNC: 10 MG/DL (ref 8.7–10.3)
CHLORIDE SERPL-SCNC: 102 MMOL/L (ref 96–106)
CHOLEST SERPL-MCNC: 245 MG/DL (ref 100–199)
CO2 SERPL-SCNC: 22 MMOL/L (ref 20–29)
CREAT SERPL-MCNC: 0.82 MG/DL (ref 0.57–1)
ERYTHROCYTE [DISTWIDTH] IN BLOOD BY AUTOMATED COUNT: 13.4 % (ref 12.3–15.4)
EST. AVERAGE GLUCOSE BLD GHB EST-MCNC: 352 MG/DL
GLOBULIN SER CALC-MCNC: 2.1 G/DL (ref 1.5–4.5)
GLUCOSE SERPL-MCNC: 176 MG/DL (ref 65–99)
HBA1C MFR BLD: 13.9 % (ref 4.8–5.6)
HCT VFR BLD AUTO: 41.4 % (ref 34–46.6)
HDLC SERPL-MCNC: 52 MG/DL
HGB BLD-MCNC: 13.3 G/DL (ref 11.1–15.9)
INTERPRETATION, 910389: NORMAL
LDLC SERPL CALC-MCNC: 146 MG/DL (ref 0–99)
Lab: NORMAL
MCH RBC QN AUTO: 27 PG (ref 26.6–33)
MCHC RBC AUTO-ENTMCNC: 32.1 G/DL (ref 31.5–35.7)
MCV RBC AUTO: 84 FL (ref 79–97)
PLATELET # BLD AUTO: 285 X10E3/UL (ref 150–450)
POTASSIUM SERPL-SCNC: 4.2 MMOL/L (ref 3.5–5.2)
PROT SERPL-MCNC: 6.4 G/DL (ref 6–8.5)
RBC # BLD AUTO: 4.93 X10E6/UL (ref 3.77–5.28)
SODIUM SERPL-SCNC: 142 MMOL/L (ref 134–144)
TRIGL SERPL-MCNC: 233 MG/DL (ref 0–149)
TSH SERPL DL<=0.005 MIU/L-ACNC: 0.8 UIU/ML (ref 0.45–4.5)
VLDLC SERPL CALC-MCNC: 47 MG/DL (ref 5–40)
WBC # BLD AUTO: 9.2 X10E3/UL (ref 3.4–10.8)

## 2019-07-15 DIAGNOSIS — E11.9 TYPE 2 DIABETES MELLITUS WITHOUT COMPLICATION, UNSPECIFIED WHETHER LONG TERM INSULIN USE (HCC): ICD-10-CM

## 2019-07-17 RX ORDER — ATORVASTATIN CALCIUM 40 MG/1
TABLET, FILM COATED ORAL
Qty: 60 TAB | Refills: 1 | Status: SHIPPED | OUTPATIENT
Start: 2019-07-17 | End: 2019-07-18 | Stop reason: SDUPTHER

## 2019-07-18 ENCOUNTER — TELEPHONE (OUTPATIENT)
Dept: FAMILY MEDICINE CLINIC | Age: 74
End: 2019-07-18

## 2019-07-18 DIAGNOSIS — E11.9 TYPE 2 DIABETES MELLITUS WITHOUT COMPLICATION, UNSPECIFIED WHETHER LONG TERM INSULIN USE (HCC): ICD-10-CM

## 2019-07-18 RX ORDER — ATORVASTATIN CALCIUM 80 MG/1
80 TABLET, FILM COATED ORAL DAILY
Qty: 90 TAB | Refills: 4 | Status: SHIPPED | OUTPATIENT
Start: 2019-07-18 | End: 2020-03-18 | Stop reason: SDUPTHER

## 2019-07-19 ENCOUNTER — TELEPHONE (OUTPATIENT)
Dept: FAMILY MEDICINE CLINIC | Age: 74
End: 2019-07-19

## 2019-07-24 ENCOUNTER — OFFICE VISIT (OUTPATIENT)
Dept: FAMILY MEDICINE CLINIC | Age: 74
End: 2019-07-24

## 2019-07-24 DIAGNOSIS — E11.65 TYPE 2 DIABETES MELLITUS WITH HYPERGLYCEMIA, WITH LONG-TERM CURRENT USE OF INSULIN (HCC): ICD-10-CM

## 2019-07-24 DIAGNOSIS — Z79.4 TYPE 2 DIABETES MELLITUS WITH HYPERGLYCEMIA, WITH LONG-TERM CURRENT USE OF INSULIN (HCC): ICD-10-CM

## 2019-07-24 DIAGNOSIS — E78.5 HYPERLIPIDEMIA, UNSPECIFIED HYPERLIPIDEMIA TYPE: ICD-10-CM

## 2019-07-24 DIAGNOSIS — R63.4 WEIGHT LOSS: Primary | ICD-10-CM

## 2019-07-24 RX ORDER — GLIMEPIRIDE 4 MG/1
TABLET ORAL
COMMUNITY
Start: 2019-07-15 | End: 2020-03-18 | Stop reason: SDUPTHER

## 2019-07-24 NOTE — PROGRESS NOTES
Subjective  Particia Jere is an 72 y.o. female who presents for f/u of weight loss and review labwork. Pt shares she is doing well. Has established care with endocrinology. Last A1C 13.9% 7/11/19. Continuing victoza and metformin, endo added glimepiride. Keeping BG log, endo may add meal time insulin given readings. Next endo appt 8/6/19. Pt tolerating all medication and taking all medications as prescribed. No hypoglycemic episodes. Fasting range 120-140. BG she states was 110 this morning. No BG reading above 200. Last eye exam was a 5/17/19, reports cataracts and glaucoma but no dx of diabetic retinopathy. Denies numbness/tingling in her feet. HLD: 7/11 labs showed . Lipitor increased to 80 mg/d. Tolerating without SE.     Has been monitoring weight loss. Started after adding victoza. She has not night sweats or fevers. No exercising. No blood in stool. No vaginal bleeding. States colonoscopy UTD. No cancer in family hx. Has gained 1 lb since last visit.       Allergies - reviewed: Allergies   Allergen Reactions    Lisinopril Cough         Medications - reviewed:   Current Outpatient Medications   Medication Sig    atorvastatin (LIPITOR) 80 mg tablet Take 1 Tab by mouth daily.  LANTUS U-100 INSULIN 100 unit/mL injection INJECT 70 UNITS UNDER THE SKIN DAILY    Liraglutide (VICTOZA) 0.6 mg/0.1 mL (18 mg/3 mL) pnij Inject 1.8mg daily    losartan (COZAAR) 100 mg tablet TAKE ONE TABLET BY MOUTH DAILY    metFORMIN (GLUCOPHAGE) 850 mg tablet Take 1 Tab by mouth two (2) times daily (with meals).  Insulin Needles, Disposable, 30 gauge x 1/3\" by SubCUTAneous route daily.  chlorthalidone (HYGROTEN) 25 mg tablet Take 1 Tab by mouth daily.  aspirin 81 mg chewable tablet Take 1 tablet by mouth daily.  glimepiride (AMARYL) 4 mg tablet      No current facility-administered medications for this visit.           Past Medical History - reviewed:  Past Medical History:   Diagnosis Date    Diabetes ([facility] [address]     Hypercholesterolemia     Hypertension          Past Surgical History - reviewed:   Past Surgical History:   Procedure Laterality Date    BREAST SURGERY PROCEDURE UNLISTED      benign breast tumor    HX ANKLE FRACTURE TX Right 2015    HX COLONOSCOPY      HX HIP REPLACEMENT Left     HX HYSTERECTOMY           Social History - reviewed:  Social History     Tobacco Use    Smoking status: Former Smoker     Years: 10.00     Last attempt to quit: 2000     Years since quittin.1    Smokeless tobacco: Never Used    Tobacco comment: RARE CIGARETTE WHEN SMOKING   Substance and Sexual Activity    Alcohol use: Yes     Alcohol/week: 6.0 standard drinks     Types: 6 Cans of beer per week     Comment: social / occasional    Drug use: No         ROS  No fever or chills  No CP  No SOB  No abdominal pain or blood in stool      Physical Exam  Visit Vitals  /65   Pulse 80   Temp 98.3 °F (36.8 °C)   Resp 19   Ht 5' 2\" (1.575 m)   Wt 170 lb (77.1 kg)   LMP 1988   SpO2 97%   BMI 31.09 kg/m²       Constitutional: oriented to person, place, and time. appears well-developed and well-nourished. Cardiovascular: Normal rate, regular rhythm, normal heart sounds and intact distal pulses. No murmur heard. Pulmonary/Chest: Effort normal and breath sounds normal. No respiratory distress. no wheezes. no rales. Abdominal: Soft. Bowel sounds are normal. no distension. There is no tenderness. There is no rebound and no guarding. Psychiatric: normal mood and affect. behavior is normal. Judgment and thought content normal.       Assessment/Plan    ICD-10-CM ICD-9-CM    1. Weight loss R63.4 783.21    2. Type 2 diabetes mellitus with hyperglycemia, with long-term current use of insulin (HCC) E11.65 250.00     Z79.4 790.29      V58.67    3. Hyperlipidemia, unspecified hyperlipidemia type E78.5 272.4    4. BMI 31.0-31.9,adult Z68.31 V85.31      Reviewed labs. DM: pt followed by [facility] (Dr. Karen Peralta). Next appt 8/9. Pt to meet with diabetic education 8/4. Discussed diet and lifestyle modification. Weight loss: weight is stable. Maybe likely to medication (victoza) and perhaps uncontrolled DM. Labs reviewed. colonoscopy utd per pt, need to obtain record from Dr. Leeanna Talbot office. Pt scheduled for mammogram. Monitor weight at home.     No refills needed. Pt continue all medications.     Discussed with attending      Follow-up and Dispositions    · Return in about 3 months (around 10/24/2019), or if symptoms worsen or fail to improve. I have discussed the diagnosis with the patient and the intended plan as seen in the above orders. Patient verbalized understanding of the plan and agrees with the plan. The patient has received an after-visit summary and questions were answered concerning future plans. I have discussed medication side effects and warnings with the patient as well. Informed patient to return to the office if new symptoms arise.         Margarito Mckinney DO  Family Medicine Resident

## 2019-07-24 NOTE — PATIENT INSTRUCTIONS
Marti Andrews MD  [facility]  [address]  [address] [address]  Phone: [phone]  Fax: [phone]

## 2019-07-25 VITALS
WEIGHT: 170 LBS | SYSTOLIC BLOOD PRESSURE: 127 MMHG | OXYGEN SATURATION: 97 % | DIASTOLIC BLOOD PRESSURE: 65 MMHG | RESPIRATION RATE: 19 BRPM | HEART RATE: 80 BPM | HEIGHT: 62 IN | TEMPERATURE: 98.3 F | BODY MASS INDEX: 31.28 KG/M2

## 2019-09-06 ENCOUNTER — OFFICE VISIT (OUTPATIENT)
Dept: FAMILY MEDICINE CLINIC | Age: 74
End: 2019-09-06

## 2019-09-06 DIAGNOSIS — R63.4 WEIGHT LOSS: Primary | ICD-10-CM

## 2019-09-06 DIAGNOSIS — Z28.21 REFUSED INFLUENZA VACCINE: ICD-10-CM

## 2019-09-06 NOTE — PROGRESS NOTES
Subjective  Lesly Hippo is an 72 y.o. female who presents for f/u. States she is doing well. Tolerating all medications. Has been following up with Dr. Yovany Zaidi, endocrinology, for DMT2. Obtained eye exam last week and noted to have cataracts b/l, mild, will f/u 3/2020. Has been monitoring weight loss. Started after adding victoza. She has not night sweats or fevers. No exercising. No blood in stool. No vaginal bleeding.  States colonoscopy UTD. Never had abnormal pap smear. Hx of smoking, quit 15 years ago more that 30 pack year hx, never had lung cancer screening. Mammogram scheduled for next week. No cancer in family hx. Has lost 4 lbs since last visit. Allergies - reviewed: Allergies   Allergen Reactions    Lisinopril Cough         Medications - reviewed:   Current Outpatient Medications   Medication Sig    glimepiride (AMARYL) 4 mg tablet     atorvastatin (LIPITOR) 80 mg tablet Take 1 Tab by mouth daily.  LANTUS U-100 INSULIN 100 unit/mL injection INJECT 70 UNITS UNDER THE SKIN DAILY    losartan (COZAAR) 100 mg tablet TAKE ONE TABLET BY MOUTH DAILY    metFORMIN (GLUCOPHAGE) 850 mg tablet Take 1 Tab by mouth two (2) times daily (with meals).  Insulin Needles, Disposable, 30 gauge x 1/3\" by SubCUTAneous route daily.  chlorthalidone (HYGROTEN) 25 mg tablet Take 1 Tab by mouth daily.  aspirin 81 mg chewable tablet Take 1 tablet by mouth daily.  liraglutide (VICTOZA 3-TREVON) 0.6 mg/0.1 mL (18 mg/3 mL) pnij DIAL AND INJECT SUBCUTANEOUSLY 1.8MG DAILY     No current facility-administered medications for this visit.           Past Medical History - reviewed:  Past Medical History:   Diagnosis Date    Diabetes ([facility] [address]     Hypercholesterolemia     Hypertension          Past Surgical History - reviewed:   Past Surgical History:   Procedure Laterality Date    BREAST SURGERY PROCEDURE UNLISTED      benign breast tumor    HX ANKLE FRACTURE TX Right 06/2015    HX COLONOSCOPY      HX HIP REPLACEMENT Left     HX HYSTERECTOMY           Social History - reviewed:  Social History     Tobacco Use    Smoking status: Former Smoker     Years: 10.00     Last attempt to quit: 10/2/2004     Years since quitting: 15    Smokeless tobacco: Never Used   Substance and Sexual Activity    Alcohol use: Yes     Alcohol/week: 6.0 standard drinks     Types: 6 Cans of beer per week     Comment: social / occasional    Drug use: No         Family History - reviewed:  Family History   Problem Relation Age of Onset    Alcohol abuse Father     Cancer Brother     Heart Disease Brother     Arthritis-osteo Neg Hx     Asthma Neg Hx     Bleeding Prob Neg Hx     Diabetes Neg Hx     Elevated Lipids Neg Hx     Headache Neg Hx     Hypertension Neg Hx     Lung Disease Neg Hx     Migraines Neg Hx     Psychiatric Disorder Neg Hx     Stroke Neg Hx     Mental Retardation Neg Hx     Anesth Problems Neg Hx          Immunizations - reviewed:   Immunization History   Administered Date(s) Administered    Influenza Vaccine (Quad) PF 10/20/2016    Influenza Vaccine Split 10/11/2012    Pneumococcal Polysaccharide (PPSV-23) 01/21/2013    Pneumococcal Vaccine (Pcv) 02/02/2010    Tdap 08/21/2014     ROS  No fever or chills  No CP  No SOB  No abdominal pain or blood in stool    Physical Exam  Visit Vitals  /89   Pulse 85   Temp 98 °F (36.7 °C)   Resp 16   Ht 5' 2\" (1.575 m)   Wt 166 lb (75.3 kg)   LMP 02/21/1988   SpO2 95%   BMI 30.36 kg/m²     Constitutional: oriented to person, place, and time. appears well-developed and well-nourished. Cardiovascular: Normal rate, regular rhythm, normal heart sounds and intact distal pulses. No murmur heard. Pulmonary/Chest: Effort normal and breath sounds normal. No respiratory distress. no wheezes. no rales. Abdominal: Soft. Bowel sounds are normal. no distension. There is no tenderness. There is no rebound and no guarding. Psychiatric: normal mood and affect.  behavior is normal. Judgment and thought content normal.       Assessment/Plan    ICD-10-CM ICD-9-CM    1. Weight loss R63.4 783.21 CT LOW DOSE LUNG CANCER SCREENING   2. Refused influenza vaccine Z28.21 V64.06      Weight loss: weight is stable. Maybe likely to medication (victoza) and perhaps uncontrolled DM. Colonoscopy utd per pt, need to obtain record from Dr. Starla Carty office. Pt scheduled for mammogram and need to schedule CT lung screen. Monitor weight at home. Continue current treatment regimen     Follow-up with specialist     Refused influenza vaccine. Cannot afford shingrix. States she is UTD with PNA vaccines (asked for records)    Discussed with attending    Follow-up and Dispositions    · Return in about 4 weeks (around 10/4/2019), or if symptoms worsen or fail to improve, for weight check. I have discussed the diagnosis with the patient and the intended plan as seen in the above orders. Patient verbalized understanding of the plan and agrees with the plan. The patient has received an after-visit summary and questions were answered concerning future plans. I have discussed medication side effects and warnings with the patient as well. Informed patient to return to the office if new symptoms arise.         Venita Buckner, DO  Family Medicine Resident

## 2019-09-09 VITALS
BODY MASS INDEX: 30.55 KG/M2 | HEIGHT: 62 IN | WEIGHT: 166 LBS | SYSTOLIC BLOOD PRESSURE: 143 MMHG | HEART RATE: 85 BPM | TEMPERATURE: 98 F | OXYGEN SATURATION: 95 % | DIASTOLIC BLOOD PRESSURE: 89 MMHG | RESPIRATION RATE: 16 BRPM

## 2019-11-01 ENCOUNTER — OFFICE VISIT (OUTPATIENT)
Dept: FAMILY MEDICINE CLINIC | Age: 74
End: 2019-11-01

## 2019-11-01 VITALS
DIASTOLIC BLOOD PRESSURE: 95 MMHG | RESPIRATION RATE: 19 BRPM | TEMPERATURE: 98.6 F | BODY MASS INDEX: 31.47 KG/M2 | HEIGHT: 62 IN | OXYGEN SATURATION: 97 % | WEIGHT: 171 LBS | SYSTOLIC BLOOD PRESSURE: 147 MMHG | HEART RATE: 81 BPM

## 2019-11-01 DIAGNOSIS — I10 ESSENTIAL HYPERTENSION: ICD-10-CM

## 2019-11-01 DIAGNOSIS — L85.3 DRY SKIN DERMATITIS: Primary | ICD-10-CM

## 2019-11-01 NOTE — PATIENT INSTRUCTIONS
Skin Care  Avoid drying agents such as tap water, aluminum acetate, and lotions (mostly made of water)  Use wetting agents such as ointments (Aquaphor or vasaline) and creams  Apply ointments or creams within 3 minutes of bathing!   Take short showers in warm (not hot) water  Use moisturizing soaps  Use bath oil in baths (not baby oil)  Minimize handwashing  Hydrocortisone cream over the skin

## 2019-11-01 NOTE — PROGRESS NOTES
Subjective  William Padron is an 72 y.o. female who presents for skin itching. Pt here to be evaluated itching on her upper back. States its on/off for 1 month. Worse in morning and night. Denies insect bite, trauma, sunburn, skin color change or discharge. No sick contacts. Area is itchy and not painful. No neck pain. No treatment attempted. BP noted to be elevated today. Pt does have hx of HTN. Takes losartan and chlorthalidone daily. No SE. Notes BP at home is well controlled in 120s/80s. Does not keep BP log. No CP, headache, or change in vision today. Allergies - reviewed: Allergies   Allergen Reactions    Lisinopril Cough         Medications - reviewed:   Current Outpatient Medications   Medication Sig    chlorthalidone (HYGROTEN) 25 mg tablet Take 1 Tab by mouth daily.  losartan (COZAAR) 100 mg tablet TAKE ONE TABLET BY MOUTH DAILY    liraglutide (VICTOZA 3-TREVON) 0.6 mg/0.1 mL (18 mg/3 mL) pnij DIAL AND INJECT SUBCUTANEOUSLY 1.8MG DAILY    glimepiride (AMARYL) 4 mg tablet     atorvastatin (LIPITOR) 80 mg tablet Take 1 Tab by mouth daily.  LANTUS U-100 INSULIN 100 unit/mL injection INJECT 70 UNITS UNDER THE SKIN DAILY    metFORMIN (GLUCOPHAGE) 850 mg tablet Take 1 Tab by mouth two (2) times daily (with meals).  Insulin Needles, Disposable, 30 gauge x 1/3\" by SubCUTAneous route daily.  aspirin 81 mg chewable tablet Take 1 tablet by mouth daily. No current facility-administered medications for this visit. Past Medical History - reviewed:  Past Medical History:   Diagnosis Date    Diabetes ([facility] [address]     Hypercholesterolemia     Hypertension        Social History - reviewed:  Social History     Tobacco Use    Smoking status: Former Smoker     Years: 10.00     Last attempt to quit: 6/2/2004     Years since quitting: 15.4    Smokeless tobacco: Never Used   Substance and Sexual Activity    Alcohol use:  Yes     Alcohol/week: 6.0 standard drinks     Types: 6 Cans of beer per week     Comment: social / occasional    Drug use: No    Sexual activity: Not Currently     ROS  No fever or chills  No CP  No SOB  No abdominal pain or blood in stool    Physical Exam  Visit Vitals  BP (!) 147/95   Pulse 81   Temp 98.6 °F (37 °C)   Resp 19   Ht 5' 2\" (1.575 m)   Wt 171 lb (77.6 kg)   LMP 02/21/1988   SpO2 97%   BMI 31.28 kg/m²       Constitutional: oriented to person, place, and time. appears well-developed and well-nourished. Cardiovascular: Normal rate, regular rhythm, normal heart sounds and intact distal pulses. No murmur heard. Pulmonary/Chest: Effort normal and breath sounds normal. No respiratory distress. no wheezes. no rales. Psychiatric: normal mood and affect. behavior is normal. Judgment and thought content normal.   Skin - Dry skin dermatitis noted on the upper back over the area of T1. normal coloration and turgor, no rashes, no suspicious skin lesions noted    Assessment/Plan    ICD-10-CM ICD-9-CM    1. Dry skin dermatitis L85.3 692.89    2. Essential hypertension I10 401.9      Itching likely to dry skin. reassuring exam today. Discussed supportive skin care. Instructions given in AVS. All questions answered. Precautions given. HTN: BP not at goal. Pt states controlled at home. Continue current regimen. BP log to be collected and follow up next week. Discussed low salt diet. Parameters discussed. Precautions given    Discussed with attending    I have discussed the diagnosis with the patient and the intended plan as seen in the above orders. Patient verbalized understanding of the plan and agrees with the plan. The patient has received an after-visit summary and questions were answered concerning future plans. I have discussed medication side effects and warnings with the patient as well. Informed patient to return to the office if new symptoms arise.         Hanna Townsend, DO  Family Medicine Resident

## 2019-11-03 RX ORDER — LOSARTAN POTASSIUM 100 MG/1
TABLET ORAL
Qty: 90 TAB | Refills: 0 | Status: SHIPPED | OUTPATIENT
Start: 2019-11-03 | End: 2020-03-18 | Stop reason: SDUPTHER

## 2019-11-03 RX ORDER — CHLORTHALIDONE 25 MG/1
25 TABLET ORAL DAILY
Qty: 90 TAB | Refills: 0 | Status: SHIPPED | OUTPATIENT
Start: 2019-11-03 | End: 2020-03-18 | Stop reason: SDUPTHER

## 2019-11-04 NOTE — PROGRESS NOTES
I saw and evaluated the patient, performing the key elements of the service. I discussed the findings, assessment and plan with the resident and agree with the resident's findings and plan as documented in the resident's note. It seems she has gotten in to the itch-scratch cycle that is hard to break.

## 2020-03-18 ENCOUNTER — TELEPHONE (OUTPATIENT)
Dept: FAMILY MEDICINE CLINIC | Age: 75
End: 2020-03-18

## 2020-03-18 DIAGNOSIS — I10 ESSENTIAL HYPERTENSION: ICD-10-CM

## 2020-03-18 DIAGNOSIS — E11.9 TYPE 2 DIABETES MELLITUS WITHOUT COMPLICATION, UNSPECIFIED WHETHER LONG TERM INSULIN USE (HCC): ICD-10-CM

## 2020-03-18 RX ORDER — ATORVASTATIN CALCIUM 80 MG/1
80 TABLET, FILM COATED ORAL DAILY
Qty: 30 TAB | Refills: 0 | Status: SHIPPED | OUTPATIENT
Start: 2020-03-18

## 2020-03-18 RX ORDER — LOSARTAN POTASSIUM 100 MG/1
TABLET ORAL
Qty: 30 TAB | Refills: 0 | Status: SHIPPED | OUTPATIENT
Start: 2020-03-18

## 2020-03-18 RX ORDER — CHLORTHALIDONE 25 MG/1
25 TABLET ORAL DAILY
Qty: 30 TAB | Refills: 0 | Status: SHIPPED | OUTPATIENT
Start: 2020-03-18

## 2020-03-18 RX ORDER — LIRAGLUTIDE 6 MG/ML
INJECTION SUBCUTANEOUS
Qty: 9 ML | Refills: 1 | Status: SHIPPED | OUTPATIENT
Start: 2020-03-18 | End: 2020-04-13

## 2020-03-18 RX ORDER — INSULIN GLARGINE 100 [IU]/ML
INJECTION, SOLUTION SUBCUTANEOUS
Qty: 30 ML | Refills: 0 | Status: SHIPPED | OUTPATIENT
Start: 2020-03-18 | End: 2021-05-10

## 2020-03-18 RX ORDER — GLIMEPIRIDE 4 MG/1
4 TABLET ORAL
Qty: 30 TAB | Refills: 0 | Status: SHIPPED | OUTPATIENT
Start: 2020-03-18 | End: 2021-05-10

## 2020-03-18 NOTE — TELEPHONE ENCOUNTER
I attempted to reach pt. She needs to follow up with me for labs in 2-3 weeks. Best that she endocrinology but I am not sure why she has not followed up with them. I need to see her for BP check    This pt received a limited amt of refills as she has poor control of DM and BP.      She needs to be seen in 2-3 weeks

## 2020-03-18 NOTE — TELEPHONE ENCOUNTER
Patient is asking for Rx refills on \"all her meds\" she gave me the names of the medications I've selected in this message     Please advise of Rx refills due to COVID-19 protocol    thanks

## 2020-03-18 NOTE — TELEPHONE ENCOUNTER
----- Message from Maryann Allen DO sent at 3/18/2020  3:20 PM EDT -----  I attempted to reach pt. She needs to follow up with me for labs in 2-3 weeks. Best that she endocrinology but I am not sure why she has not followed up with them. I need to see her for BP check    This pt received a limited amt of refills as she has poor control of DM and BP.      She needs to be seen in 2-3 weeks

## 2020-03-19 NOTE — TELEPHONE ENCOUNTER
Appt scheduled for 4. 3.2020 w/  for Endocrin    Scheduled patient for appt on:  Appointment Information  The following appointments have been successfully scheduled:    Date/time Friday, April 10, 2020 08:00 AM  Patient Anat Funk 1945 ([id number] F) [phone]  Department [facility]  Appointment type Acute Care  Provider Violet De La Vega    for [facility] for BP check     Patient aware to come to appt solo & to be aware of screening at front door for COVID-19  She understood    Close enc

## 2020-03-30 ENCOUNTER — TELEPHONE (OUTPATIENT)
Dept: FAMILY MEDICINE CLINIC | Age: 75
End: 2020-03-30

## 2020-03-30 NOTE — TELEPHONE ENCOUNTER
Patient called to reschedule appt. She ended up saying she would call right back up here. Says she can only come in later in the day/evening due to having to get a ride.

## 2020-04-09 ENCOUNTER — TELEPHONE (OUTPATIENT)
Dept: FAMILY MEDICINE CLINIC | Age: 75
End: 2020-04-09

## 2020-04-09 NOTE — TELEPHONE ENCOUNTER
: Office visit friday 04/10/2020. Received: Today   Message Contents   Zhane Prom M             Please set this patient up with My Chart for Dr. Cha Souza. She had appointment on Monday morning, but he will not be in clinic. She needs virtual visit first for BP check per /pt aware ONLY her for visit. Dr. Cha Meekss is on Telemed on Monday morning, her in-house appointment was at [address] [place] [address]. If she does not have a smart device, then make it a [id number] [facility] telephone only visit. Thanks,   Yanni Brewer    Previous Messages      ----- Message -----   From: Collette Uriostegui DO   Sent: 4/9/2020  11:07 AM EDT   To: Eleuterio Bowman   Subject: RE: Office visit friday 04/10/2020.               May we set her up for a virtual clinic visit? That would be great. If not able I am more than happy to reach out to Ms Janel Chester. Thank you for clarification. I am here if you need anything. Hope you are having a good day and I wish I was in clinic to talk to you personally. Tisha Beckman   ----- Message -----   From: Dominick Ball   Sent: 4/9/2020  10:23 AM EDT   To: Hiram Low DO   Subject: Office visit friday 04/10/2020.                   You are on the triage clinic in the afternoon tomorrow 04/10/2020, sitting in the back, calling patients, handling messages, etc. This patient is marked as to reschedule. Did I send you a message about her earlier this week? I wanted to know if you were going to do a telephone visit with her, instead of her coming in? The appointment was made back in March. Protocol is for the patients to have virtual visits each time before they are brought in to be seen during our [facility] clinic status. Would you let me know if she should be switched to a virtual visit, or if you just want to call her? I can have a PSR call and get her set up on My Chart for you.      Yanni Brewer   ----- Message -----   From: Collette Uriostegui DO   Sent: 4/8/2020   7:14 PM EDT To: Erika Alvarado     According to my schedule I am in clinic this morning as there is this appt in place. . was there an update?      Thanks,   Chiki Aguilar

## 2020-04-09 NOTE — TELEPHONE ENCOUNTER
Called patient and she doesn't have mychart or any smart phone or tablet device therefore I scheduled her for a TELEPHONE visit only on:       Date/time Monday, April 13, 2020 08:00 AM  Patient Krystina Leone [birth year] ([id number] F) [phone]  Department [facility]  Appointment type Virtual Visit Special Case 15  Provider Tonja Spurling  Appointment type notes Virtual Visit Special Case 15  for visits related to COVID    Close enc

## 2020-04-13 ENCOUNTER — VIRTUAL VISIT (OUTPATIENT)
Dept: FAMILY MEDICINE CLINIC | Age: 75
End: 2020-04-13

## 2020-04-13 DIAGNOSIS — I10 ESSENTIAL HYPERTENSION: ICD-10-CM

## 2020-04-13 DIAGNOSIS — E78.5 HYPERLIPIDEMIA, UNSPECIFIED HYPERLIPIDEMIA TYPE: ICD-10-CM

## 2020-04-13 DIAGNOSIS — E11.9 TYPE 2 DIABETES MELLITUS WITHOUT COMPLICATION, UNSPECIFIED WHETHER LONG TERM INSULIN USE (HCC): Primary | ICD-10-CM

## 2020-04-13 RX ORDER — SEMAGLUTIDE 1.34 MG/ML
INJECTION, SOLUTION SUBCUTANEOUS
COMMUNITY
Start: 2020-03-18 | End: 2021-05-10

## 2020-04-13 NOTE — PROGRESS NOTES
Phil Mackay is a 69 y.o. female evaluated via telephone on 4/13/2020. Consent:  She and/or health care decision maker is aware that that she may receive a bill for this telephone service, depending on her insurance coverage, and has provided verbal consent to proceed: Yes      Documentation:  Pt was called to follow up DM2, HTN. Pt has had poor follow up therefore a virtual visit was offered to her, but pt does not have a phone or tablet. A decision between me and pt was made to contact eachother via telephone. DM:   Last A1C 13.9% 7/11/19. Currently on ozempic, metformin, glimepiride, lantus (50 units). Pt tolerating all medication and taking all medications as prescribed. No hypoglycemic episodes. Keep BG log. Notes fasting BG range 110-150. No BG reading above 200. Followed by [facility], Dr. Aquilino Cabrera MD. Had to miss follow up given current COVID restrictions and unable to take part of virtual visit. Last eye exam was a 5/17/19, reports cataracts and glaucoma but no dx of diabetic retinopathy. Denies numbness/tingling in her feet.     HTN:   Takes losartan and chlorthalidone daily. No SE. No BP cuff at home. No CP, headache, or change in vision today.       HLD: 7/11 labs showed . Lipitor increased to 80 mg/d. Tolerating without SE. Pt notes she is staying home. No sick contacts. Leaves home to grocery shop q2 weeks, wears mask and gloves. ROS  No fever, chills, fatigue  No CP  No SOB, cough  No abdominal pain or blood in stool    Current Outpatient Medications   Medication Sig    semaglutide (Ozempic) 1 mg/dose (2 mg/1.5 mL) sub-q pen inject 1 mg.  losartan (COZAAR) 100 mg tablet TAKE ONE TABLET BY MOUTH DAILY    chlorthalidone (HYGROTEN) 25 mg tablet Take 1 Tab by mouth daily.  atorvastatin (LIPITOR) 80 mg tablet Take 1 Tab by mouth daily.  glimepiride (AMARYL) 4 mg tablet Take 1 Tab by mouth every morning.     insulin glargine (Lantus U-100 Insulin) 100 unit/mL injection INJECT 50 UNITS UNDER THE SKIN DAILY     metFORMIN (GLUCOPHAGE) 850 mg tablet Take 1 Tab by mouth two (2) times daily (with meals).  Insulin Needles, Disposable, 30 gauge x 1/3\" by SubCUTAneous route daily.  aspirin 81 mg chewable tablet Take 1 tablet by mouth daily. No current facility-administered medications for this visit. Plan:    ICD-10-CM ICD-9-CM    1. Type 2 diabetes mellitus without complication, unspecified whether long term insulin use (HCC) Q61.8 217.93 METABOLIC PANEL, COMPREHENSIVE      HEMOGLOBIN A1C WITH EAG      CBC W/O DIFF      AMB POC URINE, MICROALBUMIN, SEMIQUANT (3 RESULTS)   2. Essential hypertension I12 202.7 METABOLIC PANEL, COMPREHENSIVE      miscellaneous medical supply (Blood Pressure Cuff) misc   3. Hyperlipidemia, unspecified hyperlipidemia type E78.5 272.4 LIPID PANEL     Pt was contacted via telephone    No concerns. Is aware that she has not been following up with me or endo as instructed/planned. Will place lab orders and pt will come into our office to obtain labs noted above. COVID precautions given and pt is without symptoms, contacts with COVID+ or anyone currently quarantined. Will order BP cuff    Keep BP and BG log. No medication refills needed. Will forward labs to endo once available. No questions per pt    Precautions given.     Pt discussed with attending      Lourdes Garner, DO

## 2020-07-15 ENCOUNTER — HOSPITAL ENCOUNTER (OUTPATIENT)
Dept: CT IMAGING | Age: 75
Discharge: HOME | End: 2020-07-15
Attending: FAMILY MEDICINE
Payer: MEDICARE

## 2020-07-15 DIAGNOSIS — R11.0 NAUSEA: ICD-10-CM

## 2020-07-15 DIAGNOSIS — R10.13 EPIGASTRIC PAIN: ICD-10-CM

## 2020-07-15 LAB — CREAT BLD-MCNC: 1.2 MG/DL (ref 0.6–1.3)

## 2020-07-15 PROCEDURE — 74177 CT ABD & PELVIS W/CONTRAST: CPT

## 2020-07-15 PROCEDURE — 74011636320 HC RX REV CODE- 636/320: Performed by: RADIOLOGY

## 2020-07-15 PROCEDURE — 82565 ASSAY OF CREATININE: CPT

## 2020-07-15 RX ADMIN — IOPAMIDOL 100 ML: 755 INJECTION, SOLUTION INTRAVENOUS at 13:21

## 2021-02-24 ENCOUNTER — TRANSCRIBE ORDER (OUTPATIENT)
Dept: SCHEDULING | Age: 76
End: 2021-02-24

## 2021-02-24 DIAGNOSIS — Z12.31 SCREENING MAMMOGRAM FOR HIGH-RISK PATIENT: ICD-10-CM

## 2021-02-24 DIAGNOSIS — Z78.0 MENOPAUSE: Primary | ICD-10-CM

## 2021-03-04 ENCOUNTER — HOSPITAL ENCOUNTER (OUTPATIENT)
Dept: MAMMOGRAPHY | Age: 76
Discharge: HOME | End: 2021-03-04
Attending: FAMILY MEDICINE
Payer: MEDICARE

## 2021-03-04 DIAGNOSIS — Z78.0 MENOPAUSE: ICD-10-CM

## 2021-03-04 DIAGNOSIS — Z12.31 SCREENING MAMMOGRAM FOR HIGH-RISK PATIENT: ICD-10-CM

## 2021-03-04 PROCEDURE — 77067 SCR MAMMO BI INCL CAD: CPT

## 2021-03-04 PROCEDURE — 77080 DXA BONE DENSITY AXIAL: CPT

## 2021-03-18 ENCOUNTER — TRANSCRIBE ORDER (OUTPATIENT)
Dept: SCHEDULING | Age: 76
End: 2021-03-18

## 2021-03-18 DIAGNOSIS — M16.11 PRIMARY OSTEOARTHRITIS OF RIGHT HIP: Primary | ICD-10-CM

## 2021-03-29 ENCOUNTER — HOSPITAL ENCOUNTER (OUTPATIENT)
Dept: CT IMAGING | Age: 76
Discharge: HOME | End: 2021-03-29
Attending: PHYSICIAN ASSISTANT
Payer: MEDICARE

## 2021-03-29 DIAGNOSIS — M16.11 PRIMARY OSTEOARTHRITIS OF RIGHT HIP: ICD-10-CM

## 2021-03-29 PROCEDURE — 73700 CT LOWER EXTREMITY W/O DYE: CPT

## 2021-04-07 DIAGNOSIS — E11.9 TYPE 2 DIABETES MELLITUS WITHOUT COMPLICATION, UNSPECIFIED WHETHER LONG TERM INSULIN USE (HCC): ICD-10-CM

## 2021-04-12 RX ORDER — ATORVASTATIN CALCIUM 80 MG/1
80 TABLET, FILM COATED ORAL DAILY
Qty: 30 TAB | Refills: 0 | OUTPATIENT
Start: 2021-04-12

## 2021-04-28 NOTE — PROGRESS NOTES
Scheduled PAT appointments with patient's daughter per patient's request.  Patient denies history of positive Covid testing. Patient and daughter state that they have internet access and would like to take the joint class online. Class information will be given to patient and daughter at PAT appointment.

## 2021-05-10 ENCOUNTER — HOSPITAL ENCOUNTER (OUTPATIENT)
Dept: PREADMISSION TESTING | Age: 76
Discharge: HOME | End: 2021-05-10
Payer: MEDICARE

## 2021-05-10 VITALS
HEIGHT: 63 IN | RESPIRATION RATE: 20 BRPM | OXYGEN SATURATION: 97 % | SYSTOLIC BLOOD PRESSURE: 136 MMHG | TEMPERATURE: 97.5 F | WEIGHT: 210.76 LBS | HEART RATE: 103 BPM | BODY MASS INDEX: 37.34 KG/M2 | DIASTOLIC BLOOD PRESSURE: 82 MMHG

## 2021-05-10 LAB
ABO + RH BLD: NORMAL
ALBUMIN SERPL-MCNC: 3.7 G/DL (ref 3.5–5)
ALBUMIN/GLOB SERPL: 1.1 {RATIO} (ref 1.1–2.2)
ALP SERPL-CCNC: 72 U/L (ref 45–117)
ALT SERPL-CCNC: 38 U/L (ref 12–78)
ANION GAP SERPL CALC-SCNC: 3 MMOL/L (ref 5–15)
APPEARANCE UR: CLEAR
AST SERPL-CCNC: 25 U/L (ref 15–37)
ATRIAL RATE: 97 BPM
BACTERIA URNS QL MICRO: NEGATIVE /HPF
BASOPHILS # BLD: 0.1 K/UL (ref 0–0.1)
BASOPHILS NFR BLD: 1 % (ref 0–1)
BILIRUB SERPL-MCNC: 0.3 MG/DL (ref 0.2–1)
BILIRUB UR QL: NEGATIVE
BLOOD GROUP ANTIBODIES SERPL: NORMAL
BUN SERPL-MCNC: 24 MG/DL (ref 6–20)
BUN/CREAT SERPL: 18 (ref 12–20)
CALCIUM SERPL-MCNC: 9.6 MG/DL (ref 8.5–10.1)
CALCULATED P AXIS, ECG09: 62 DEGREES
CALCULATED R AXIS, ECG10: 1 DEGREES
CALCULATED T AXIS, ECG11: 64 DEGREES
CHLORIDE SERPL-SCNC: 107 MMOL/L (ref 97–108)
CO2 SERPL-SCNC: 31 MMOL/L (ref 21–32)
COLOR UR: NORMAL
CREAT SERPL-MCNC: 1.32 MG/DL (ref 0.55–1.02)
CRP SERPL-MCNC: <0.29 MG/DL (ref 0–0.6)
DIAGNOSIS, 93000: NORMAL
DIFFERENTIAL METHOD BLD: ABNORMAL
EOSINOPHIL # BLD: 0.3 K/UL (ref 0–0.4)
EOSINOPHIL NFR BLD: 3 % (ref 0–7)
EPITH CASTS URNS QL MICRO: NORMAL /LPF
ERYTHROCYTE [DISTWIDTH] IN BLOOD BY AUTOMATED COUNT: 14.6 % (ref 11.5–14.5)
ERYTHROCYTE [SEDIMENTATION RATE] IN BLOOD: 11 MM/HR (ref 0–30)
EST. AVERAGE GLUCOSE BLD GHB EST-MCNC: 177 MG/DL
GLOBULIN SER CALC-MCNC: 3.3 G/DL (ref 2–4)
GLUCOSE SERPL-MCNC: 74 MG/DL (ref 65–100)
GLUCOSE UR STRIP.AUTO-MCNC: NEGATIVE MG/DL
HBA1C MFR BLD: 7.8 % (ref 4–5.6)
HCT VFR BLD AUTO: 40.2 % (ref 35–47)
HGB BLD-MCNC: 12.7 G/DL (ref 11.5–16)
HGB UR QL STRIP: NEGATIVE
IMM GRANULOCYTES # BLD AUTO: 0.1 K/UL (ref 0–0.04)
IMM GRANULOCYTES NFR BLD AUTO: 1 % (ref 0–0.5)
KETONES UR QL STRIP.AUTO: NEGATIVE MG/DL
LEUKOCYTE ESTERASE UR QL STRIP.AUTO: NEGATIVE
LYMPHOCYTES # BLD: 2.9 K/UL (ref 0.8–3.5)
LYMPHOCYTES NFR BLD: 30 % (ref 12–49)
MCH RBC QN AUTO: 27.5 PG (ref 26–34)
MCHC RBC AUTO-ENTMCNC: 31.6 G/DL (ref 30–36.5)
MCV RBC AUTO: 87 FL (ref 80–99)
MONOCYTES # BLD: 1 K/UL (ref 0–1)
MONOCYTES NFR BLD: 10 % (ref 5–13)
NEUTS SEG # BLD: 5.5 K/UL (ref 1.8–8)
NEUTS SEG NFR BLD: 55 % (ref 32–75)
NITRITE UR QL STRIP.AUTO: NEGATIVE
NRBC # BLD: 0 K/UL (ref 0–0.01)
NRBC BLD-RTO: 0 PER 100 WBC
P-R INTERVAL, ECG05: 166 MS
PH UR STRIP: 6 [PH] (ref 5–8)
PLATELET # BLD AUTO: 305 K/UL (ref 150–400)
PMV BLD AUTO: 10.1 FL (ref 8.9–12.9)
POTASSIUM SERPL-SCNC: 4.3 MMOL/L (ref 3.5–5.1)
PROT SERPL-MCNC: 7 G/DL (ref 6.4–8.2)
PROT UR STRIP-MCNC: NEGATIVE MG/DL
Q-T INTERVAL, ECG07: 344 MS
QRS DURATION, ECG06: 88 MS
QTC CALCULATION (BEZET), ECG08: 436 MS
RBC # BLD AUTO: 4.62 M/UL (ref 3.8–5.2)
RBC #/AREA URNS HPF: NORMAL /HPF (ref 0–5)
SODIUM SERPL-SCNC: 141 MMOL/L (ref 136–145)
SP GR UR REFRACTOMETRY: 1.01 (ref 1–1.03)
SPECIMEN EXP DATE BLD: NORMAL
UA: UC IF INDICATED,UAUC: NORMAL
UROBILINOGEN UR QL STRIP.AUTO: 0.2 EU/DL (ref 0.2–1)
VENTRICULAR RATE, ECG03: 97 BPM
WBC # BLD AUTO: 9.7 K/UL (ref 3.6–11)
WBC URNS QL MICRO: NORMAL /HPF (ref 0–4)

## 2021-05-10 PROCEDURE — 81001 URINALYSIS AUTO W/SCOPE: CPT

## 2021-05-10 PROCEDURE — 80053 COMPREHEN METABOLIC PANEL: CPT

## 2021-05-10 PROCEDURE — 86140 C-REACTIVE PROTEIN: CPT

## 2021-05-10 PROCEDURE — 36415 COLL VENOUS BLD VENIPUNCTURE: CPT

## 2021-05-10 PROCEDURE — 85025 COMPLETE CBC W/AUTO DIFF WBC: CPT

## 2021-05-10 PROCEDURE — 93005 ELECTROCARDIOGRAM TRACING: CPT

## 2021-05-10 PROCEDURE — 85652 RBC SED RATE AUTOMATED: CPT

## 2021-05-10 PROCEDURE — 86901 BLOOD TYPING SEROLOGIC RH(D): CPT

## 2021-05-10 PROCEDURE — 83036 HEMOGLOBIN GLYCOSYLATED A1C: CPT

## 2021-05-10 PROCEDURE — 84466 ASSAY OF TRANSFERRIN: CPT

## 2021-05-10 RX ORDER — DEXTROMETHORPHAN HYDROBROMIDE, GUAIFENESIN 5; 100 MG/5ML; MG/5ML
650 LIQUID ORAL AS NEEDED
COMMUNITY

## 2021-05-10 RX ORDER — INSULIN GLARGINE 100 [IU]/ML
35 INJECTION, SOLUTION SUBCUTANEOUS 2 TIMES DAILY
COMMUNITY

## 2021-05-10 RX ORDER — DICLOFENAC SODIUM 75 MG/1
75 TABLET, DELAYED RELEASE ORAL 2 TIMES DAILY
COMMUNITY
End: 2021-05-26

## 2021-05-10 NOTE — H&P
Preoperative Evaluation                     History and Physical with Surgical Risk Stratification     5/10/2021    CC: Right hip pain    HPI:   Lukasz Guerrero is a 70 y.o. female referred for pre-operative evaluation by Dr. Cate Walters for surgery on 5/24/21. Julio Ross notes she has had hip pain since January of this year. The pain started mild and is now worse. She is using cane for support. She notes the pain used to be on the right hip and now the pain is in the left hip when she walks. She has no pain with sitting. Denies falls or numbness. She notes extended walking increases her pain. She cannot walk through [place] without the electric cart. The patient was evaluated in the surgeon's office and it was determined that the most appropriate plan of care is to proceed with surgical intervention. Patient's PCP Rio Huff MD    Review of Systems     Constitutional: Negative for chills and fever  HENT: Negative for congestion and sore throat  Eyes: negative for blurred vision and double vision  Respiratory: Negative for cough, shortness of breath and wheezing  Mouth: Top and bottom dentures  Cardiovascular: Negative for chest pain and palpitations  Gastrointestinal: Negative for abdominal pain, nausea, diarrhea & constipation  Genitourinary: Negative for dysuria and hematuria  Musculoskeletal: Right hip pain  Skin: Negative for rash, open wounds. Neurological: Negative for dizziness, tremors and headaches  Psychiatric: The patient is not nervous/anxious.     Inherent Risk of Surgery     Surgical risk:  Intermediate  Intermediate:  Joint Replacement, Spinal surgery, abdominal, thoracic, carotid endarterctomy, prostate, head and neck    Patient Cardiac Risk Assessment     Revised Cardiac Risk Index (RCRI)  DM requiring insulin therapy  Rate if cardiac death, nonfatal MI, nonfatal cardiac arrest by number of risk factor- 1.0%    ZAHIDA/AHA 2007 Guidelines:   1) Surgery Emergency, Non-cardiac -> to surgery  2) If not, look at clinical predictors    Intermediate Minor   Abnormal EKGDiabetes  Blood Thinner: ASA    METS      EQUAL TO 4 Care for self Walk indoors around house Walk 2-3 blocks on level ground (2-3 mph) Light work around house (dust, dishes)     Other Risk Factors:   Screening for ETOH use:  Done and low risk  Smoking status:  Former   Marijuana Use:  No    Personal or FH of bleeding problems:  No  Personal or FH of blood clots:  No  Personal or FH of anesthesia problems:   No    Pulmonary Risk:  Asthma or COPD:  No  Body mass index is 37.33 kg/m². Known NELIDA:  No    Past Medical, Surgical, Social History     Allergies: Allergies   Allergen Reactions    Lisinopril Cough       Medication Documentation Review Audit     Reviewed by Letty Fung RN (Registered Nurse) on 05/10/21 at 5730    Medication Sig Documenting Provider Last Dose Status Taking?   acetaminophen (Tylenol Arthritis Pain) 650 mg TbER Take 650 mg by mouth as needed for Pain. Provider, Historical  Active Yes   aspirin 81 mg chewable tablet Take 1 tablet by mouth daily. Teresa Clarke MD  Active Yes           Med Note (Liam Starch   Mon May 10, 2021  9:01 AM)     atorvastatin (LIPITOR) 80 mg tablet Take 1 Tab by mouth daily. Derick Hodgkins, DO  Active Yes   chlorthalidone (HYGROTEN) 25 mg tablet Take 1 Tab by mouth daily. Derick Hodgkins, DO  Active Yes   diclofenac EC (VOLTAREN) 75 mg EC tablet Take 75 mg by mouth two (2) times a day. Provider, Historical  Active Yes   insulin glargine (Lantus Solostar U-100 Insulin) 100 unit/mL (3 mL) inpn 35 Units by SubCUTAneous route two (2) times a day. Provider, Historical  Active Yes   insulin lispro (HUMALOG PEN SC) 10 Units by SubCUTAneous route daily (with lunch). Provider, Historical  Active Yes   insulin lispro (HUMALOG PEN SC) 30 Units by SubCUTAneous route daily (with dinner).  Provider, Historical  Active Yes   insulin lispro (HUMALOG PEN SC) 20 Units by SubCUTAneous route every morning. Provider, Historical  Active Yes   losartan (COZAAR) 100 mg tablet TAKE ONE TABLET BY MOUTH DAILY Jimbo Bernstein, DO  Active Yes   metFORMIN (GLUCOPHAGE) 850 mg tablet Take 1 Tab by mouth two (2) times daily (with meals). Rosibel Avendaño MD  Active Yes              Past Medical History:   Diagnosis Date    Diabetes ([address]     Hypercholesterolemia     Hypertension     Menopause     LMP-29years old     Past Surgical History:   Procedure Laterality Date    HX ANKLE FRACTURE TX Right 2015    HX BREAST BIOPSY Bilateral 15years old    Bilateral breast surgical biopsies, all benign. (x3)    HX CATARACT REMOVAL Right 2020    HX COLONOSCOPY      HX HIP REPLACEMENT Left     HX HYSTERECTOMY      LMP-29years old    AK BREAST SURGERY PROCEDURE UNLISTED      benign breast tumor     Social History     Tobacco Use    Smoking status: Former Smoker     Years: 10.00     Types: Cigarettes     Quit date: 2004     Years since quittin.9    Smokeless tobacco: Never Used   Substance Use Topics    Alcohol use:  Yes     Alcohol/week: 6.0 standard drinks     Types: 6 Cans of beer per week     Comment: social / occasional    Drug use: No     Family History   Problem Relation Age of Onset    Alcohol abuse Father     Cancer Brother     Heart Disease Brother     Arthritis-osteo Neg Hx     Asthma Neg Hx     Bleeding Prob Neg Hx     Diabetes Neg Hx     Elevated Lipids Neg Hx     Headache Neg Hx     Hypertension Neg Hx     Lung Disease Neg Hx     Migraines Neg Hx     Psychiatric Disorder Neg Hx     Stroke Neg Hx     Mental Retardation Neg Hx     Anesth Problems Neg Hx        Objective     Vitals:    05/10/21 0857   BP: 136/82   Pulse: (!) 103   Resp: 20   Temp: 97.5 °F (36.4 °C)   SpO2: 97%   Weight: 95.6 kg (210 lb 12.2 oz)   Height: 5' 3\" (1.6 m)       Constitutional:  Appears well,  No Acute Distress, Vitals noted  Psychiatric:   Affect normal, Alert and Oriented to person/place/time    Eyes:   Pupils equally round and reactive, EOMI, conjunctiva clear, eyelids normal  ENT:   External ears and nose normal, teeth abnormal, gums normal, TMs and Orophyarynx normal  Neck:   General inspection and Thyroid normal.  No abnormal cervical or supraclavicular nodes    Lungs:   Clear to auscultation, good respiratory effort  Heart: Ausculation normal.  Regular rhythm. Tachycardia. No cardiac murmurs. No carotid bruits or palpable thrills  Chest wall normal  Musculoskeletal: Gait antalgic  Extremities:   Without edema, good peripheral pulses  Skin:   Warm to palpation, without rashes, bruising, or suspicious lesions     Recent Results (from the past 168 hour(s))   C REACTIVE PROTEIN, QT    Collection Time: 05/10/21 10:05 AM   Result Value Ref Range    C-Reactive protein <0.29 0.00 - 0.60 mg/dL   CBC WITH AUTOMATED DIFF    Collection Time: 05/10/21 10:05 AM   Result Value Ref Range    WBC 9.7 3.6 - 11.0 K/uL    RBC 4.62 3.80 - 5.20 M/uL    HGB 12.7 11.5 - 16.0 g/dL    HCT 40.2 35.0 - 47.0 %    MCV 87.0 80.0 - 99.0 FL    MCH 27.5 26.0 - 34.0 PG    MCHC 31.6 30.0 - 36.5 g/dL    RDW 14.6 (H) 11.5 - 14.5 %    PLATELET 005 108 - 190 K/uL    MPV 10.1 8.9 - 12.9 FL    NRBC 0.0 0  WBC    ABSOLUTE NRBC 0.00 0.00 - 0.01 K/uL    NEUTROPHILS 55 32 - 75 %    LYMPHOCYTES 30 12 - 49 %    MONOCYTES 10 5 - 13 %    EOSINOPHILS 3 0 - 7 %    BASOPHILS 1 0 - 1 %    IMMATURE GRANULOCYTES 1 (H) 0.0 - 0.5 %    ABS. NEUTROPHILS 5.5 1.8 - 8.0 K/UL    ABS. LYMPHOCYTES 2.9 0.8 - 3.5 K/UL    ABS. MONOCYTES 1.0 0.0 - 1.0 K/UL    ABS. EOSINOPHILS 0.3 0.0 - 0.4 K/UL    ABS. BASOPHILS 0.1 0.0 - 0.1 K/UL    ABS. IMM.  GRANS. 0.1 (H) 0.00 - 0.04 K/UL    DF AUTOMATED     METABOLIC PANEL, COMPREHENSIVE    Collection Time: 05/10/21 10:05 AM   Result Value Ref Range    Sodium 141 136 - 145 mmol/L    Potassium 4.3 3.5 - 5.1 mmol/L    Chloride 107 97 - 108 mmol/L    CO2 31 21 - 32 mmol/L    Anion gap 3 (L) 5 - 15 mmol/L Glucose 74 65 - 100 mg/dL    BUN 24 (H) 6 - 20 MG/DL    Creatinine 1.32 (H) 0.55 - 1.02 MG/DL    BUN/Creatinine ratio 18 12 - 20      GFR est AA 48 (L) >60 ml/min/1.73m2    GFR est non-AA 39 (L) >60 ml/min/1.73m2    Calcium 9.6 8.5 - 10.1 MG/DL    Bilirubin, total 0.3 0.2 - 1.0 MG/DL    ALT (SGPT) 38 12 - 78 U/L    AST (SGOT) 25 15 - 37 U/L    Alk.  phosphatase 72 45 - 117 U/L    Protein, total 7.0 6.4 - 8.2 g/dL    Albumin 3.7 3.5 - 5.0 g/dL    Globulin 3.3 2.0 - 4.0 g/dL    A-G Ratio 1.1 1.1 - 2.2     HEMOGLOBIN A1C WITH EAG    Collection Time: 05/10/21 10:05 AM   Result Value Ref Range    Hemoglobin A1c 7.8 (H) 4.0 - 5.6 %    Est. average glucose 177 mg/dL   SED RATE (ESR)    Collection Time: 05/10/21 10:05 AM   Result Value Ref Range    Sed rate, automated 11 0 - 30 mm/hr   TRANSFERRIN    Collection Time: 05/10/21 10:05 AM   Result Value Ref Range    Transferrin 271 192 - 364 mg/dL   URINALYSIS W/ REFLEX CULTURE    Collection Time: 05/10/21 10:05 AM    Specimen: Urine   Result Value Ref Range    Color YELLOW/STRAW      Appearance CLEAR CLEAR      Specific gravity 1.010 1.003 - 1.030      pH (UA) 6.0 5.0 - 8.0      Protein Negative NEG mg/dL    Glucose Negative NEG mg/dL    Ketone Negative NEG mg/dL    Bilirubin Negative NEG      Blood Negative NEG      Urobilinogen 0.2 0.2 - 1.0 EU/dL    Nitrites Negative NEG      Leukocyte Esterase Negative NEG      WBC 0-4 0 - 4 /hpf    RBC 0-5 0 - 5 /hpf    Epithelial cells FEW FEW /lpf    Bacteria Negative NEG /hpf    UA:UC IF INDICATED CULTURE NOT INDICATED BY UA RESULT CNI     TYPE & SCREEN    Collection Time: 05/10/21 10:05 AM   Result Value Ref Range    Crossmatch Expiration 05/24/2021,2359     ABO/Rh(D) A POSITIVE     Antibody screen NEG    EKG, 12 LEAD, INITIAL    Collection Time: 05/10/21 10:14 AM   Result Value Ref Range    Ventricular Rate 97 BPM    Atrial Rate 97 BPM    P-R Interval 166 ms    QRS Duration 88 ms    Q-T Interval 344 ms    QTC Calculation (Bezet) 436 ms    Calculated P Axis 62 degrees    Calculated R Axis 1 degrees    Calculated T Axis 64 degrees    Diagnosis       Normal sinus rhythm  Anteroseptal infarct , age undetermined  Abnormal ECG  No previous ECGs available  Confirmed by Deo Madsen MD. ([id number]) on 5/10/2021 4:26:48 PM         Assessment and Plan     Assessment/Plan:   1) OA Right Hip  2) Pre-Operative Evaluation    Labs and EKG reviewed. MRSA pending    Plan:  Right hip replacement    Preoperative Clearance  Per RCRI, the patient has a 1.0% risk of cardiac death, nonfatal MI, nonfatal cardiac arrest based on one risk factors. Per ACC/AHA guidelines, patient is intermediate risk for a(n) intermediate risk surgery and may proceed to planned surgery with the above noted risk.     Elizabeth Pickens NP    Addendum: MRSA negative  A1C results sent to surgeons inbox via EMR

## 2021-05-10 NOTE — PERIOP NOTES
[address] , [address] [facility]   MAIN OR                                  [phone]   MAIN PRE OP                          [phone]                                                                                AMBULATORY PRE OP          [phone]  PRE-ADMISSION TESTING    [phone]   Surgery Date:  Monday 5/24/21       Is surgery arrival time given by surgeon? NO  If [facility] Pagan staff will call you Friday 5/21/21 between 3 and 7pm the day before your surgery with your arrival time. (If your surgery is on a Monday, we will call you the Friday before.)    Call [phone] after 7pm Monday-Friday if you did not receive this call. INSTRUCTIONS BEFORE YOUR SURGERY   When You  Arrive Arrive at the [address] [facility] on the day of your surgery  Have your insurance card, photo ID, and any copayment (if needed)   Food   and   Drink NO food or drink after midnight the night before surgery    This means NO water, gum, mints, coffee, juice, etc.  No alcohol (beer, wine, liquor) 24 hours before and after surgery   Medications to   TAKE   Morning of Surgery MEDICATIONS TO TAKE THE MORNING OF SURGERY WITH A SIP OF WATER:    Lipitor, hygroten   Medications  To  STOP      7 days before surgery  Non-Steroidal anti-inflammatory Drugs (NSAID's): for example, Ibuprofen (Advil, Motrin), Naproxen (Aleve)   Aspirin, if taking for pain    Herbal supplements, vitamins, and fish oil   diclofenac     Blood  Thinners  If you take  Aspirin, Plavix, Coumadin, or any blood-thinning or anti-blood clot medicine, talk to the doctor who prescribed the medications for pre-operative instructions.    Bathing Clothing  Jewelry  Valuables      MAY shower the morning of surgery, please do not apply anything to your skin (lotions, powders, deodorant, or makeup, especially mascara)   Follow Chlorhexidine Care Fusion body wash instructions provided to you during PAT appointment. Begin 3 days prior to surgery.  Do not shave or trim anywhere 24 hours before surgery   Wear your hair loose or down; no pony-tails, buns, or metal hair clips   Wear loose, comfortable, clean clothes   Wear glasses instead of contacts   Leave money, valuables, and jewelry, including body piercings, at home   Going Home - or Spending the Night  SAME-DAY SURGERY: You must have a responsible adult drive you home and stay with you 24 hours after surgery   ADMITS: If your doctor is keeping you in the hospital after surgery, leave personal belongings/luggage in your car until you have a hospital room number. Hospital discharge time is 12 noon  Drivers must be here before 12 noon unless you are told differently   Special Instructions · Bring PTA Medication list day of surgery with the last doses taken documented   · Do not bring medication bottles the day of surgery  · Do not take any diabetic medication on day of surgery  · Call Dr Jones Francois for Insulin adjustments prior to surgey        It is now mandated that all surgical patients be tested for COVID-19 prior to surgery. Testing has to be exactly 4 days prior to surgery. Your COVID test date is Thursday 5/20/21 between 8:00 am and 11:00 am.       COVID testing will be performed curbside at the [facility] [address]. There will be signs leading you to the testing site. You will need to bring a photo ID with you to be swabbed. Patients are advised to self-quarantine at home after testing and prior to your surgery date. You will be notified if your results are positive.     What to watch for:   Coronavirus (COVID-19) affects different people in different ways   It also appears with a wide range of symptoms from mild to severe   Signs usually appear 2-14 days after exposure     If you develop any of the following, notify your doctor immediately:  o Fever  o Chills, with or without a shiver  o Muscle pain  o Headache  o Sore throat  o Dry cough  o New loss of taste or smell  o Tiredness      If you develop any of the following, call [phone]:  o Shortness of breath  o Difficulty breathing  o Chest pain  o New confusion  o Blueness of fingers and/or lips     Follow all instructions so your surgery wont be cancelled. Please, be on time. If a situation occurs and you are delayed the day of surgery, call [phone] or [phone]. If your physical condition changes (like a fever, cold, flu, etc.) call your surgeon. Home medication(s) reviewed and verified via    VERBAL   during PAT appointment. The patient was contacted by     IN-PERSON  The patient verbalizes understanding of all instructions and      DOES NOT   need reinforcement.

## 2021-05-11 LAB
BACTERIA SPEC CULT: NORMAL
BACTERIA SPEC CULT: NORMAL
SERVICE CMNT-IMP: NORMAL
TRANSFERRIN SERPL-MCNC: 271 MG/DL (ref 192–364)

## 2021-05-20 ENCOUNTER — HOSPITAL ENCOUNTER (OUTPATIENT)
Dept: PREADMISSION TESTING | Age: 76
Discharge: HOME | End: 2021-05-20
Payer: MEDICARE

## 2021-05-20 PROCEDURE — U0005 INFEC AGEN DETEC AMPLI PROBE: HCPCS

## 2021-05-21 ENCOUNTER — ANESTHESIA EVENT (OUTPATIENT)
Dept: SURGERY | Age: 76
End: 2021-05-21
Payer: MEDICARE

## 2021-05-21 LAB — SARS-COV-2, COV2NT: NOT DETECTED

## 2021-05-24 ENCOUNTER — APPOINTMENT (OUTPATIENT)
Dept: GENERAL RADIOLOGY | Age: 76
End: 2021-05-24
Attending: PHYSICIAN ASSISTANT
Payer: MEDICARE

## 2021-05-24 ENCOUNTER — ANESTHESIA (OUTPATIENT)
Dept: SURGERY | Age: 76
End: 2021-05-24
Payer: MEDICARE

## 2021-05-24 ENCOUNTER — HOSPITAL ENCOUNTER (OUTPATIENT)
Age: 76
Setting detail: OBSERVATION
Discharge: HOME | End: 2021-05-26
Attending: ORTHOPAEDIC SURGERY | Admitting: ORTHOPAEDIC SURGERY
Payer: MEDICARE

## 2021-05-24 DIAGNOSIS — G89.18 POST-OP PAIN: Primary | ICD-10-CM

## 2021-05-24 PROBLEM — M16.11 PRIMARY OSTEOARTHRITIS OF RIGHT HIP: Status: ACTIVE | Noted: 2021-05-24

## 2021-05-24 LAB
GLUCOSE BLD STRIP.AUTO-MCNC: 144 MG/DL (ref 65–117)
GLUCOSE BLD STRIP.AUTO-MCNC: 173 MG/DL (ref 65–117)
GLUCOSE BLD STRIP.AUTO-MCNC: 176 MG/DL (ref 65–117)
GLUCOSE BLD STRIP.AUTO-MCNC: 226 MG/DL (ref 65–117)
SERVICE CMNT-IMP: ABNORMAL

## 2021-05-24 PROCEDURE — 77030034479 HC ADH SKN CLSR PRINEO J&J -B: Performed by: ORTHOPAEDIC SURGERY

## 2021-05-24 PROCEDURE — 77030007866 HC KT SPN ANES BBMI -B

## 2021-05-24 PROCEDURE — 82962 GLUCOSE BLOOD TEST: CPT

## 2021-05-24 PROCEDURE — 74011250636 HC RX REV CODE- 250/636: Performed by: ANESTHESIOLOGY

## 2021-05-24 PROCEDURE — 99218 HC RM OBSERVATION: CPT

## 2021-05-24 PROCEDURE — 77030040922 HC BLNKT HYPOTHRM STRY -A

## 2021-05-24 PROCEDURE — 74011250636 HC RX REV CODE- 250/636: Performed by: PHYSICIAN ASSISTANT

## 2021-05-24 PROCEDURE — 97530 THERAPEUTIC ACTIVITIES: CPT

## 2021-05-24 PROCEDURE — 77030040361 HC SLV COMPR DVT MDII -B

## 2021-05-24 PROCEDURE — 77030002916 HC SUT ETHLN J&J -A: Performed by: ORTHOPAEDIC SURGERY

## 2021-05-24 PROCEDURE — 77030038587 HC LNR BOOT DISP ALLN -B: Performed by: ORTHOPAEDIC SURGERY

## 2021-05-24 PROCEDURE — 74011000250 HC RX REV CODE- 250: Performed by: ORTHOPAEDIC SURGERY

## 2021-05-24 PROCEDURE — 74011000250 HC RX REV CODE- 250: Performed by: PHYSICIAN ASSISTANT

## 2021-05-24 PROCEDURE — C1776 JOINT DEVICE (IMPLANTABLE): HCPCS | Performed by: ORTHOPAEDIC SURGERY

## 2021-05-24 PROCEDURE — 77030018723 HC ELCTRD BLD COVD -A: Performed by: ORTHOPAEDIC SURGERY

## 2021-05-24 PROCEDURE — 77030029829 HC TIB INST CKPNT DISP STRY -B: Performed by: ORTHOPAEDIC SURGERY

## 2021-05-24 PROCEDURE — 97161 PT EVAL LOW COMPLEX 20 MIN: CPT

## 2021-05-24 PROCEDURE — 74011000250 HC RX REV CODE- 250: Performed by: ANESTHESIOLOGY

## 2021-05-24 PROCEDURE — 74011000250 HC RX REV CODE- 250: Performed by: NURSE ANESTHETIST, CERTIFIED REGISTERED

## 2021-05-24 PROCEDURE — 2709999900 HC NON-CHARGEABLE SUPPLY: Performed by: ORTHOPAEDIC SURGERY

## 2021-05-24 PROCEDURE — 77030031139 HC SUT VCRL2 J&J -A: Performed by: ORTHOPAEDIC SURGERY

## 2021-05-24 PROCEDURE — 77030020263 HC SOL INJ SOD CL0.9% LFCR 1000ML: Performed by: ORTHOPAEDIC SURGERY

## 2021-05-24 PROCEDURE — 77030038023 HC PIN FIX MAKO STRY -B: Performed by: ORTHOPAEDIC SURGERY

## 2021-05-24 PROCEDURE — 77030029821: Performed by: ORTHOPAEDIC SURGERY

## 2021-05-24 PROCEDURE — 74011250636 HC RX REV CODE- 250/636: Performed by: NURSE ANESTHETIST, CERTIFIED REGISTERED

## 2021-05-24 PROCEDURE — 77030010507 HC ADH SKN DERMBND J&J -B: Performed by: ORTHOPAEDIC SURGERY

## 2021-05-24 PROCEDURE — 77030020788: Performed by: ORTHOPAEDIC SURGERY

## 2021-05-24 PROCEDURE — 72170 X-RAY EXAM OF PELVIS: CPT

## 2021-05-24 PROCEDURE — 74011250637 HC RX REV CODE- 250/637: Performed by: PHYSICIAN ASSISTANT

## 2021-05-24 PROCEDURE — 77030006835 HC BLD SAW SAG STRY -B: Performed by: ORTHOPAEDIC SURGERY

## 2021-05-24 PROCEDURE — 76030000022 HC AMB SURG 2.5 TO 3 HR INTENSV-TIER 1: Performed by: ORTHOPAEDIC SURGERY

## 2021-05-24 PROCEDURE — 77030036660

## 2021-05-24 PROCEDURE — 74011250637 HC RX REV CODE- 250/637: Performed by: ANESTHESIOLOGY

## 2021-05-24 PROCEDURE — 77030041075 HC DRSG AG OPTIFRM MDII -B: Performed by: ORTHOPAEDIC SURGERY

## 2021-05-24 PROCEDURE — 97116 GAIT TRAINING THERAPY: CPT

## 2021-05-24 PROCEDURE — 76210000034 HC AMBSU PH I REC 0.5 TO 1 HR: Performed by: ORTHOPAEDIC SURGERY

## 2021-05-24 PROCEDURE — 77030035236 HC SUT PDS STRATFX BARB J&J -B: Performed by: ORTHOPAEDIC SURGERY

## 2021-05-24 PROCEDURE — 74011250637 HC RX REV CODE- 250/637: Performed by: ORTHOPAEDIC SURGERY

## 2021-05-24 PROCEDURE — 77030018673: Performed by: ORTHOPAEDIC SURGERY

## 2021-05-24 PROCEDURE — 74011000258 HC RX REV CODE- 258: Performed by: NURSE ANESTHETIST, CERTIFIED REGISTERED

## 2021-05-24 PROCEDURE — 76060000065 HC AMB SURG ANES 2.5 TO 3 HR: Performed by: ORTHOPAEDIC SURGERY

## 2021-05-24 PROCEDURE — 77030002933 HC SUT MCRYL J&J -A: Performed by: ORTHOPAEDIC SURGERY

## 2021-05-24 PROCEDURE — 77030041680 HC PNCL ELECSURG SMK EVAC CNMD -B: Performed by: ORTHOPAEDIC SURGERY

## 2021-05-24 DEVICE — CERAMIC V40 FEMORAL HEAD
Type: IMPLANTABLE DEVICE | Site: HIP | Status: FUNCTIONAL
Brand: BIOLOX

## 2021-05-24 DEVICE — 6.5MM LOW PROFILE HEX SCREW 30MM
Type: IMPLANTABLE DEVICE | Site: HIP | Status: FUNCTIONAL
Brand: TRIDENT II

## 2021-05-24 DEVICE — TRIDENT X3 0 DEGREE POLYETHYLENE INSERT
Type: IMPLANTABLE DEVICE | Site: HIP | Status: FUNCTIONAL
Brand: TRIDENT X3 INSERT

## 2021-05-24 DEVICE — 127 DEGREE NECK ANGLE HIP STEM
Type: IMPLANTABLE DEVICE | Site: HIP | Status: FUNCTIONAL
Brand: ACCOLADE

## 2021-05-24 DEVICE — HIP H2 TOT ADV OTHER HD -- IMPL CAPPED H2: Type: IMPLANTABLE DEVICE | Status: FUNCTIONAL

## 2021-05-24 RX ORDER — OXYCODONE HCL 10 MG/1
10 TABLET, FILM COATED, EXTENDED RELEASE ORAL ONCE
Status: COMPLETED | OUTPATIENT
Start: 2021-05-24 | End: 2021-05-24

## 2021-05-24 RX ORDER — SODIUM CHLORIDE 9 MG/ML
125 INJECTION, SOLUTION INTRAVENOUS CONTINUOUS
Status: DISPENSED | OUTPATIENT
Start: 2021-05-24 | End: 2021-05-25

## 2021-05-24 RX ORDER — AMOXICILLIN 250 MG
1 CAPSULE ORAL 2 TIMES DAILY
Status: DISCONTINUED | OUTPATIENT
Start: 2021-05-24 | End: 2021-05-26 | Stop reason: HOSPADM

## 2021-05-24 RX ORDER — SODIUM CHLORIDE 0.9 % (FLUSH) 0.9 %
5-40 SYRINGE (ML) INJECTION EVERY 8 HOURS
Status: DISCONTINUED | OUTPATIENT
Start: 2021-05-24 | End: 2021-05-26 | Stop reason: HOSPADM

## 2021-05-24 RX ORDER — POLYETHYLENE GLYCOL 3350 17 G/17G
17 POWDER, FOR SOLUTION ORAL DAILY
Status: DISCONTINUED | OUTPATIENT
Start: 2021-05-25 | End: 2021-05-26 | Stop reason: HOSPADM

## 2021-05-24 RX ORDER — HYDROMORPHONE HYDROCHLORIDE 1 MG/ML
.25-1 INJECTION, SOLUTION INTRAMUSCULAR; INTRAVENOUS; SUBCUTANEOUS
Status: DISCONTINUED | OUTPATIENT
Start: 2021-05-24 | End: 2021-05-24 | Stop reason: HOSPADM

## 2021-05-24 RX ORDER — FENTANYL CITRATE 50 UG/ML
INJECTION, SOLUTION INTRAMUSCULAR; INTRAVENOUS AS NEEDED
Status: DISCONTINUED | OUTPATIENT
Start: 2021-05-24 | End: 2021-05-24 | Stop reason: HOSPADM

## 2021-05-24 RX ORDER — DIPHENHYDRAMINE HYDROCHLORIDE 50 MG/ML
12.5 INJECTION, SOLUTION INTRAMUSCULAR; INTRAVENOUS
Status: ACTIVE | OUTPATIENT
Start: 2021-05-24 | End: 2021-05-25

## 2021-05-24 RX ORDER — SODIUM CHLORIDE, SODIUM LACTATE, POTASSIUM CHLORIDE, CALCIUM CHLORIDE 600; 310; 30; 20 MG/100ML; MG/100ML; MG/100ML; MG/100ML
125 INJECTION, SOLUTION INTRAVENOUS CONTINUOUS
Status: DISCONTINUED | OUTPATIENT
Start: 2021-05-24 | End: 2021-05-24 | Stop reason: HOSPADM

## 2021-05-24 RX ORDER — DEXTROSE 50 % IN WATER (D50W) INTRAVENOUS SYRINGE
12.5-25 AS NEEDED
Status: DISCONTINUED | OUTPATIENT
Start: 2021-05-24 | End: 2021-05-26 | Stop reason: HOSPADM

## 2021-05-24 RX ORDER — MIDAZOLAM HYDROCHLORIDE 1 MG/ML
INJECTION, SOLUTION INTRAMUSCULAR; INTRAVENOUS AS NEEDED
Status: DISCONTINUED | OUTPATIENT
Start: 2021-05-24 | End: 2021-05-24 | Stop reason: HOSPADM

## 2021-05-24 RX ORDER — OXYCODONE HYDROCHLORIDE 5 MG/1
5 TABLET ORAL
Status: DISCONTINUED | OUTPATIENT
Start: 2021-05-24 | End: 2021-05-24 | Stop reason: HOSPADM

## 2021-05-24 RX ORDER — ONDANSETRON 2 MG/ML
4 INJECTION INTRAMUSCULAR; INTRAVENOUS
Status: DISPENSED | OUTPATIENT
Start: 2021-05-24 | End: 2021-05-25

## 2021-05-24 RX ORDER — MAGNESIUM SULFATE 100 %
4 CRYSTALS MISCELLANEOUS AS NEEDED
Status: DISCONTINUED | OUTPATIENT
Start: 2021-05-24 | End: 2021-05-26 | Stop reason: HOSPADM

## 2021-05-24 RX ORDER — LIDOCAINE HYDROCHLORIDE 10 MG/ML
0.1 INJECTION, SOLUTION EPIDURAL; INFILTRATION; INTRACAUDAL; PERINEURAL AS NEEDED
Status: DISCONTINUED | OUTPATIENT
Start: 2021-05-24 | End: 2021-05-24 | Stop reason: HOSPADM

## 2021-05-24 RX ORDER — METFORMIN HYDROCHLORIDE 850 MG/1
850 TABLET ORAL 2 TIMES DAILY WITH MEALS
Status: DISCONTINUED | OUTPATIENT
Start: 2021-05-24 | End: 2021-05-26 | Stop reason: HOSPADM

## 2021-05-24 RX ORDER — ACETAMINOPHEN 325 MG/1
650 TABLET ORAL EVERY 6 HOURS
Status: DISCONTINUED | OUTPATIENT
Start: 2021-05-24 | End: 2021-05-26 | Stop reason: HOSPADM

## 2021-05-24 RX ORDER — ACETAMINOPHEN 325 MG/1
975 TABLET ORAL ONCE
Status: COMPLETED | OUTPATIENT
Start: 2021-05-24 | End: 2021-05-24

## 2021-05-24 RX ORDER — SODIUM CHLORIDE 0.9 % (FLUSH) 0.9 %
5-40 SYRINGE (ML) INJECTION AS NEEDED
Status: DISCONTINUED | OUTPATIENT
Start: 2021-05-24 | End: 2021-05-26 | Stop reason: HOSPADM

## 2021-05-24 RX ORDER — FLUMAZENIL 0.1 MG/ML
0.2 INJECTION INTRAVENOUS
Status: DISCONTINUED | OUTPATIENT
Start: 2021-05-24 | End: 2021-05-24 | Stop reason: HOSPADM

## 2021-05-24 RX ORDER — METOPROLOL TARTRATE 5 MG/5ML
INJECTION INTRAVENOUS AS NEEDED
Status: DISCONTINUED | OUTPATIENT
Start: 2021-05-24 | End: 2021-05-24 | Stop reason: HOSPADM

## 2021-05-24 RX ORDER — FACIAL-BODY WIPES
10 EACH TOPICAL DAILY PRN
Status: DISCONTINUED | OUTPATIENT
Start: 2021-05-26 | End: 2021-05-26 | Stop reason: HOSPADM

## 2021-05-24 RX ORDER — PREGABALIN 75 MG/1
75 CAPSULE ORAL ONCE
Status: COMPLETED | OUTPATIENT
Start: 2021-05-24 | End: 2021-05-24

## 2021-05-24 RX ORDER — NALOXONE HYDROCHLORIDE 0.4 MG/ML
0.4 INJECTION, SOLUTION INTRAMUSCULAR; INTRAVENOUS; SUBCUTANEOUS AS NEEDED
Status: DISCONTINUED | OUTPATIENT
Start: 2021-05-24 | End: 2021-05-26 | Stop reason: HOSPADM

## 2021-05-24 RX ORDER — ATORVASTATIN CALCIUM 20 MG/1
80 TABLET, FILM COATED ORAL DAILY
Status: DISCONTINUED | OUTPATIENT
Start: 2021-05-25 | End: 2021-05-26 | Stop reason: HOSPADM

## 2021-05-24 RX ORDER — NALOXONE HYDROCHLORIDE 0.4 MG/ML
0.2 INJECTION, SOLUTION INTRAMUSCULAR; INTRAVENOUS; SUBCUTANEOUS
Status: DISCONTINUED | OUTPATIENT
Start: 2021-05-24 | End: 2021-05-24 | Stop reason: HOSPADM

## 2021-05-24 RX ORDER — POVIDONE-IODINE 10 %
SOLUTION, NON-ORAL TOPICAL AS NEEDED
Status: DISCONTINUED | OUTPATIENT
Start: 2021-05-24 | End: 2021-05-24 | Stop reason: HOSPADM

## 2021-05-24 RX ORDER — BUPIVACAINE HYDROCHLORIDE 5 MG/ML
INJECTION, SOLUTION EPIDURAL; INTRACAUDAL AS NEEDED
Status: DISCONTINUED | OUTPATIENT
Start: 2021-05-24 | End: 2021-05-24 | Stop reason: HOSPADM

## 2021-05-24 RX ORDER — HYDROMORPHONE HYDROCHLORIDE 1 MG/ML
0.5 INJECTION, SOLUTION INTRAMUSCULAR; INTRAVENOUS; SUBCUTANEOUS
Status: ACTIVE | OUTPATIENT
Start: 2021-05-24 | End: 2021-05-25

## 2021-05-24 RX ORDER — OXYCODONE HYDROCHLORIDE 5 MG/1
5 TABLET ORAL
Status: DISCONTINUED | OUTPATIENT
Start: 2021-05-24 | End: 2021-05-26 | Stop reason: HOSPADM

## 2021-05-24 RX ORDER — DIPHENHYDRAMINE HYDROCHLORIDE 50 MG/ML
12.5 INJECTION, SOLUTION INTRAMUSCULAR; INTRAVENOUS AS NEEDED
Status: DISCONTINUED | OUTPATIENT
Start: 2021-05-24 | End: 2021-05-24 | Stop reason: HOSPADM

## 2021-05-24 RX ORDER — ASPIRIN 81 MG/1
81 TABLET ORAL 2 TIMES DAILY
Status: DISCONTINUED | OUTPATIENT
Start: 2021-05-24 | End: 2021-05-26 | Stop reason: HOSPADM

## 2021-05-24 RX ORDER — FAMOTIDINE 20 MG/1
20 TABLET, FILM COATED ORAL 2 TIMES DAILY
Status: DISCONTINUED | OUTPATIENT
Start: 2021-05-24 | End: 2021-05-24

## 2021-05-24 RX ORDER — INSULIN LISPRO 100 [IU]/ML
INJECTION, SOLUTION INTRAVENOUS; SUBCUTANEOUS
Status: DISCONTINUED | OUTPATIENT
Start: 2021-05-24 | End: 2021-05-26 | Stop reason: HOSPADM

## 2021-05-24 RX ORDER — FAMOTIDINE 20 MG/1
20 TABLET, FILM COATED ORAL EVERY EVENING
Status: DISCONTINUED | OUTPATIENT
Start: 2021-05-24 | End: 2021-05-26 | Stop reason: HOSPADM

## 2021-05-24 RX ORDER — OXYCODONE HYDROCHLORIDE 5 MG/1
10 TABLET ORAL
Status: DISCONTINUED | OUTPATIENT
Start: 2021-05-24 | End: 2021-05-26 | Stop reason: HOSPADM

## 2021-05-24 RX ORDER — PHENYLEPHRINE HCL IN 0.9% NACL 0.4MG/10ML
SYRINGE (ML) INTRAVENOUS AS NEEDED
Status: DISCONTINUED | OUTPATIENT
Start: 2021-05-24 | End: 2021-05-24 | Stop reason: HOSPADM

## 2021-05-24 RX ORDER — PROPOFOL 10 MG/ML
INJECTION, EMULSION INTRAVENOUS
Status: DISCONTINUED | OUTPATIENT
Start: 2021-05-24 | End: 2021-05-24 | Stop reason: HOSPADM

## 2021-05-24 RX ORDER — FENTANYL CITRATE 50 UG/ML
25 INJECTION, SOLUTION INTRAMUSCULAR; INTRAVENOUS
Status: DISCONTINUED | OUTPATIENT
Start: 2021-05-24 | End: 2021-05-24 | Stop reason: HOSPADM

## 2021-05-24 RX ADMIN — Medication 80 MCG: at 11:08

## 2021-05-24 RX ADMIN — TRANEXAMIC ACID 1 G: 100 INJECTION, SOLUTION INTRAVENOUS at 09:58

## 2021-05-24 RX ADMIN — BUPIVACAINE HYDROCHLORIDE 2 ML: 5 INJECTION, SOLUTION EPIDURAL; INTRACAUDAL; PERINEURAL at 09:09

## 2021-05-24 RX ADMIN — CEFAZOLIN SODIUM 2 G: 1 POWDER, FOR SOLUTION INTRAMUSCULAR; INTRAVENOUS at 09:44

## 2021-05-24 RX ADMIN — FENTANYL CITRATE 50 MCG: 0.05 INJECTION, SOLUTION INTRAMUSCULAR; INTRAVENOUS at 09:04

## 2021-05-24 RX ADMIN — MIDAZOLAM HYDROCHLORIDE 1 MG: 2 INJECTION, SOLUTION INTRAMUSCULAR; INTRAVENOUS at 08:52

## 2021-05-24 RX ADMIN — Medication 80 MCG: at 10:41

## 2021-05-24 RX ADMIN — ONDANSETRON 4 MG: 2 INJECTION INTRAMUSCULAR; INTRAVENOUS at 15:02

## 2021-05-24 RX ADMIN — ACETAMINOPHEN 975 MG: 325 TABLET ORAL at 06:54

## 2021-05-24 RX ADMIN — PROPOFOL 100 MCG/KG/MIN: 10 INJECTION, EMULSION INTRAVENOUS at 09:49

## 2021-05-24 RX ADMIN — CEFAZOLIN 2 G: 1 INJECTION, POWDER, FOR SOLUTION INTRAMUSCULAR; INTRAVENOUS at 19:39

## 2021-05-24 RX ADMIN — SODIUM CHLORIDE, POTASSIUM CHLORIDE, SODIUM LACTATE AND CALCIUM CHLORIDE 125 ML/HR: 600; 310; 30; 20 INJECTION, SOLUTION INTRAVENOUS at 06:58

## 2021-05-24 RX ADMIN — Medication 80 MCG: at 10:57

## 2021-05-24 RX ADMIN — OXYCODONE HYDROCHLORIDE 10 MG: 10 TABLET, FILM COATED, EXTENDED RELEASE ORAL at 06:54

## 2021-05-24 RX ADMIN — Medication 80 MCG: at 10:29

## 2021-05-24 RX ADMIN — Medication 81 MG: at 17:06

## 2021-05-24 RX ADMIN — SODIUM CHLORIDE 125 ML/HR: 9 INJECTION, SOLUTION INTRAVENOUS at 21:14

## 2021-05-24 RX ADMIN — FENTANYL CITRATE 50 MCG: 0.05 INJECTION, SOLUTION INTRAMUSCULAR; INTRAVENOUS at 08:49

## 2021-05-24 RX ADMIN — MIDAZOLAM HYDROCHLORIDE 1 MG: 2 INJECTION, SOLUTION INTRAMUSCULAR; INTRAVENOUS at 08:49

## 2021-05-24 RX ADMIN — PREGABALIN 75 MG: 75 CAPSULE ORAL at 06:54

## 2021-05-24 RX ADMIN — TRANEXAMIC ACID 1 G: 100 INJECTION, SOLUTION INTRAVENOUS at 11:46

## 2021-05-24 RX ADMIN — ACETAMINOPHEN 650 MG: 325 TABLET ORAL at 17:07

## 2021-05-24 RX ADMIN — Medication 80 MCG: at 10:34

## 2021-05-24 RX ADMIN — Medication 80 MCG: at 10:27

## 2021-05-24 RX ADMIN — Medication 10 ML: at 15:02

## 2021-05-24 RX ADMIN — DOCUSATE SODIUM 50MG AND SENNOSIDES 8.6MG 1 TABLET: 8.6; 5 TABLET, FILM COATED ORAL at 17:06

## 2021-05-24 RX ADMIN — FAMOTIDINE 20 MG: 20 TABLET ORAL at 17:06

## 2021-05-24 RX ADMIN — METOPROLOL TARTRATE 2.5 MG: 5 INJECTION, SOLUTION INTRAVENOUS at 12:05

## 2021-05-24 RX ADMIN — METFORMIN HYDROCHLORIDE 850 MG: 850 TABLET ORAL at 17:07

## 2021-05-24 RX ADMIN — SODIUM CHLORIDE 125 ML/HR: 9 INJECTION, SOLUTION INTRAVENOUS at 12:47

## 2021-05-24 NOTE — OP NOTES
OPERATIVE REPORT     Admit Date: 5/24/2021  Admit Diagnosis: Primary osteoarthritis of right hip [M16.11]  Right hip pain [M25.551]  Preoperative Diagnosis: Primary osteoarthritis of right hip [M16.11]  Right hip pain [M25.551]  Postoperative Diagnosis: Primary osteoarthritis of right hip [M16.11]    Procedure: Procedure(s):  RIGHT TOTAL HIP REPLACEMENT, DIRECT ANTERIOR (PINO) (BLOCK)  Surgeon: Merry Baron MD  Assistant(s): Roni Brush PA-C  Anesthesia: Regional   Estimated Blood Loss: 500cc  Specimens: * No specimens in log *   Complications: None        INDICATIONS:    The patient is a 70 y.o., female who has complained of a long history of Right hip pain / groin pain. The patient has failed conservative treatment to include medical management / therapy and presents for definitive operative care. Informed consent was obtained including a discussion of the risks and benefits, which include, but are not limited to, bleeding, infection, neurovascular damage, wound complications, pain and stiffness in the hip, periprosthetic loosening, fracture, dislocation and venous thrombo-embolic disease, the patient consented for the procedure. DESCRIPTION OF PROCEDURE:       The proper side and hip were identified and signed in the preoperative holding area. All questions were answered. The patient was given Ancef preop for an antibiotic. After adequate anesthesia, the patient was positioned supine on the [place] table, the feet were well padded in the boots. The hip was then prepped and draped in sterile fashion. The contralateral tracking array was placed. A direct anterior approach was used through skin and the tensor fascia. The interval between the Tensor Fascia and Sartorius was used and retractors were placed in an atraumatic fashion. The lateral femoral circumflex artery and vein were identified and coagulated. The proximal and distal capsule was identified and excised.  A tracking array was placed in the proximal greater trochanter. The leg length and offset were verified with the MAKOplasty probe. A standard neck cut was made according to the implant geometry. The femoral head was removed. Further soft tissue was debrided and medial capsule along the neck cut released. Acetabular exposure was then obtained and the labrum was excised along with the foveal contents. Registration and acetabular mapping was performed. Once registration was complete and all soft tissues were removed the planned acetabular reamer was used in conjunction with the MAKOplasty arm. Remaining bone and osteophyte was removed, cysts were bone grafted as necessary. The final cup was then impacted in place with haptic guidance and rigid robotic arm assistance. There was one screw placed. The liner was then placed. The femur was then exposed, residual soft tissue was removed and the box osteotome was used along with blunt rounded canal finder. Sequential broaching was started with the opening broach and then the zero broach and broached up to the final implant size. The final implant was placed and a trial head was placed then the reduction was performed. Leg lengths and offset were verified. The final head was then impacted in place and thorough head and neck irrigation, the leg length was verified again. The femoral tracker was removed. The wound was copiously irrigated with 1L of dilute betadine solution 5%. The interval between the tensor and Sartorius was closed with 0-Vicryl and running stratafix suture. The sub-Q was closed with with 2-0 Vicryl, 4-0 monocryl and dermabond. The pin sites were closed with 4-0 Monocryl. A sterile dressing was applied. The patient was awoken from anesthesia and taken to the recovery room in a stable condiition. OPERATIVE FINDINGS : Severe right hip OA noted. IMPLANTS :   Implant Name Type Inv.  Item Serial No.  Lot No. LRB No. Used Action   TRIDENT II TRITANIUM CLUSTERHOLE ACETABULAR SHELL   N/A [employer] [id number] Right 1 Implanted   TRIDENT X3 0 DEGREE POLYETHYLENE INSERT   N/A [employer] [id number] Right 1 Implanted   6.5MM LOW PROFILE HEX SCREW   N/A [employer] [id number] Right 1 Implanted   STEM FEM SZ 4 [id number] -- ACCOLADE II V40 - SNA  STEM FEM SZ 4 [id number] -- ACCOLADE II V40 NA [employer] HOWM_WD [id number] Right 1 Implanted       POST OPERATIVE CONSIDERATIONS :   WBAT w/ walker x 6 weeks for bony ingrowth. JUSTIFICATION FOR SURGICAL ASSISTANT:   Surgical Assistant, was requried and necessary in this case, to help with soft tissue retraction, extremity positioning, equiment management, implant management, and wound closure.       Carolyn Trammell MD

## 2021-05-24 NOTE — ANESTHESIA PROCEDURE NOTES
Spinal Block    Start time: 5/24/2021 8:49 AM  End time: 5/24/2021 9:09 AM  Performed by: Chidi Pedraza CRNA  Authorized by: Shanell Valero MD     Pre-procedure:   Indications: at surgeon's request and primary anesthetic  Preanesthetic Checklist: patient identified, risks and benefits discussed, anesthesia consent, site marked, patient being monitored and timeout performed    Timeout Time: 08:49 EDT          Spinal Block:   Patient Position:  Seated  Prep Region:  Lumbar  Prep: chlorhexidine      Location:  L3-4  Technique:  Single shot        Needle:   Needle Type:  Quincke  Needle Gauge:  22 G  Attempts:  3      Events: CSF confirmed, no blood with aspiration and no paresthesia        Assessment:  Insertion:  Uncomplicated  Patient tolerance:  Patient tolerated the procedure well with no immediate complications

## 2021-05-24 NOTE — ANESTHESIA POSTPROCEDURE EVALUATION
Procedure(s):  RIGHT TOTAL HIP REPLACEMENT, DIRECT ANTERIOR (PINO) (BLOCK). spinal    Anesthesia Post Evaluation      Multimodal analgesia: multimodal analgesia used between 6 hours prior to anesthesia start to PACU discharge  Patient location during evaluation: bedside  Patient participation: complete - patient participated  Level of consciousness: awake  Pain management: adequate  Airway patency: patent  Anesthetic complications: no  Cardiovascular status: acceptable  Respiratory status: acceptable  Hydration status: acceptable        INITIAL Post-op Vital signs:   Vitals Value Taken Time   /76 05/24/21 1320   Temp 36.4 °C (97.6 °F) 05/24/21 1245   Pulse 82 05/24/21 1324   Resp 21 05/24/21 1324   SpO2 96 % 05/24/21 1324   Vitals shown include unvalidated device data.

## 2021-05-24 NOTE — PROGRESS NOTES
Problem: Mobility Impaired (Adult and Pediatric)  Goal: *Acute Goals and Plan of Care (Insert Text)  Description: FUNCTIONAL STATUS PRIOR TO ADMISSION: Patient was independent and active without use of DME.    HOME SUPPORT PRIOR TO ADMISSION: The patient lived with son but did not require assistance. Physical Therapy Goals  Initiated 5/24/2021    1. Patient will move from supine to sit and sit to supine , scoot up and down, and roll side to side in bed with independence within 4 days. 2. Patient will perform sit to stand with independence within 4 days. 3. Patient will ambulate with modified independence for 250 feet with the least restrictive device within 4 days. 4. Patient will ascend/descend 14 stairs with 1 handrail(s) with supervision/set-up within 4 days. 5. Patient will verbalize and demonstrate understanding of anterior hip precautions per protocol within 4 days. 6. Patient will perform hip home exercise program per protocol with independence within 4 days. Outcome: Progressing Towards Goal   PHYSICAL THERAPY EVALUATION  Patient: Elian Cummins (73 y.o. female)  Date: 5/24/2021  Primary Diagnosis: Primary osteoarthritis of right hip [M16.11]  Right hip pain [M25.551]  Procedure(s) (LRB):  RIGHT TOTAL HIP REPLACEMENT, DIRECT ANTERIOR (PINO) (BLOCK) (Right) Day of Surgery   Precautions:   Fall, WBAT    ASSESSMENT  Based on the objective data described below, the patient presents with impaired activity tolerance, post-op nausea, R hip pain and functional mobility below baseline following R ZAIN POD#0. Reviewed HEP with good performance of heel slides, SLR and quad sets. Min A to EOB, CGA to stand and CGA to ambulate within room and bathroom. No LOB or buckling observed. Maintains step to gait pattern d/t fear of falling this day. Vitals remained stable. Unable to void on commode though while standing at sink pt with + emesis and stress incontinence of large volume of urine.  Pt assisted back to EOB and remained stadning for clean up. RN aware. Anticipate plan for d/c tomorrow. Current Level of Function Impacting Discharge (mobility/balance): Min A to EOB    Functional Outcome Measure: The patient scored 13/28 on the Tinetti outcome measure which is indicative of high fall risk. Other factors to consider for discharge: good family support     Patient will benefit from skilled therapy intervention to address the above noted impairments. PLAN :  Recommendations and Planned Interventions: bed mobility training, transfer training, gait training, therapeutic exercises, neuromuscular re-education, patient and family training/education and therapeutic activities      Frequency/Duration: Patient will be followed by physical therapy:  twice daily to address goals. Recommendation for discharge: (in order for the patient to meet his/her long term goals)  Outpatient physical therapy follow up recommended for ROM and strengthening    This discharge recommendation:  Has been made in collaboration with the attending provider and/or case management    IF patient discharges home will need the following DME: patient owns DME required for discharge         SUBJECTIVE:   Patient stated I just feel nauseous.     OBJECTIVE DATA SUMMARY:   HISTORY:    Past Medical History:   Diagnosis Date    Diabetes ([facility] [address]     Hypercholesterolemia     Hypertension     Menopause     LMP-29years old     Past Surgical History:   Procedure Laterality Date    HX ANKLE FRACTURE TX Right 06/2015    HX BREAST BIOPSY Bilateral 15years old    Bilateral breast surgical biopsies, all benign.  (x3)    HX CATARACT REMOVAL Right 12/2020    HX COLONOSCOPY      HX HIP REPLACEMENT Left     HX HYSTERECTOMY      LMP-29years old    [place] BREAST SURGERY PROCEDURE UNLISTED      benign breast tumor       Personal factors and/or comorbidities impacting plan of care: HTN, diabetes, history of ankle fracture    Home Situation  Home Environment: Private residence  # Steps to Enter: 1  One/Two Story Residence: Other (Comment)  Living Alone: Yes  Support Systems: Family member(s)  Patient Expects to be Discharged to[de-identified] Private residence  Current DME Used/Available at Home: Ollie Bully, straight, Walker, rolling  Tub or Shower Type: Tub/Shower combination    EXAMINATION/PRESENTATION/DECISION MAKING:   Critical Behavior:  Neurologic State: Alert  Orientation Level: Oriented X4        Hearing: Auditory  Auditory Impairment: None  Skin:    Edema:   Range Of Motion:  AROM: Within functional limits           PROM: Within functional limits           Strength:    Strength: Within functional limits                    Tone & Sensation:   Tone: Normal                              Coordination:  Coordination: Within functional limits  Vision:      Functional Mobility:  Bed Mobility:  Rolling: Contact guard assistance  Supine to Sit: Minimum assistance  Sit to Supine: Minimum assistance  Scooting: Contact guard assistance  Transfers:  Sit to Stand: Contact guard assistance  Stand to Sit: Contact guard assistance        Bed to Chair: Contact guard assistance              Balance:   Sitting: Intact  Standing: Intact; With support  Ambulation/Gait Training:  Distance (ft): 35 Feet (ft)  Assistive Device: Gait belt;Walker, rolling  Ambulation - Level of Assistance: Contact guard assistance;Minimal assistance        Gait Abnormalities: Antalgic;Decreased step clearance; Step to gait  Right Side Weight Bearing: As tolerated     Base of Support: Widened  Stance: Right decreased  Speed/Melida: Pace decreased (<100 feet/min)  Step Length: Right shortened;Left shortened  Swing Pattern: Right asymmetrical                  Stairs:               Therapeutic Exercises:   Hip HEP    Functional Measure:  Tinetti test:    Sitting Balance: 1  Arises: 1  Attempts to Rise: 1  Immediate Standing Balance: 1  Standing Balance: 1  Nudged: 1  Eyes Closed: 0  Turn 360 Degrees - Continuous/Discontinuous: 0  Turn 360 Degrees - Steady/Unsteady: 1  Sitting Down: 1  Balance Score: 8 Balance total score  Indication of Gait: 1  R Step Length/Height: 1  L Step Length/Height: 0  R Foot Clearance: 1  L Foot Clearance: 1  Step Symmetry: 0  Step Continuity: 0  Path: 1  Trunk: 0  Walking Time: 0  Gait Score: 5 Gait total score  Total Score: 13/28 Overall total score         Tinetti Tool Score Risk of Falls  <19 = High Fall Risk  19-24 = Moderate Fall Risk  25-28 = Low Fall Risk  Tinetti ME. Performance-Oriented Assessment of Mobility Problems in Elderly Patients. [facility] 66; [id number]. (Scoring Description: PT Bulletin Feb. 10, 1993)    Older adults: Tye Meckel et al, 2009; n = 1000 [facility] elderly evaluated with ABC, JASPREET, ADL, and IADL)  · Mean JASPREET score for males aged 69-68 years = 26.21(3.40)  · Mean JASPREET score for females age 69-68 years = 25.16(4.30)  · Mean JASPREET score for males over 80 years = 23.29(6.02)  · Mean JASPREET score for females over 80 years = 17.20(8.32)            Physical Therapy Evaluation Charge Determination   History Examination Presentation Decision-Making   MEDIUM  Complexity : 1-2 comorbidities / personal factors will impact the outcome/ POC  MEDIUM Complexity : 3 Standardized tests and measures addressing body structure, function, activity limitation and / or participation in recreation  LOW Complexity : Stable, uncomplicated  Other outcome measures Tinetti  LOW       Based on the above components, the patient evaluation is determined to be of the following complexity level: LOW     Pain Rating:  none    Activity Tolerance:   Good    After treatment patient left in no apparent distress:   Supine in bed, Call bell within reach and Caregiver / family present    COMMUNICATION/EDUCATION:   The patients plan of care was discussed with: Registered nurse.      Fall prevention education was provided and the patient/caregiver indicated understanding., Patient/family have participated as able in goal setting and plan of care. and Patient/family agree to work toward stated goals and plan of care.     Thank you for this referral.  Zakia Blackburn, PT   Time Calculation: 50 mins

## 2021-05-24 NOTE — PERIOP NOTES
TRANSFER - OUT REPORT:    Verbal report given to Milton Porras RN(name) on Sukumar Odonnell  being transferred to [facility](unit) for routine post - op       Report consisted of patients Situation, Background, Assessment and   Recommendations(SBAR). Information from the following report(s) SBAR, Kardex and MAR was reviewed with the receiving nurse. Lines:   Peripheral IV 05/24/21 Left;Posterior Hand (Active)   Site Assessment Clean, dry, & intact 05/24/21 0655   Phlebitis Assessment 0 05/24/21 0655   Dressing Status Clean, dry, & intact 05/24/21 0655   Hub Color/Line Status Pink 05/24/21 0655   Alcohol Cap Used Yes 05/24/21 0655        Opportunity for questions and clarification was provided.       Patient transported with:   Registered Nurse

## 2021-05-24 NOTE — DISCHARGE INSTRUCTIONS
TOTAL HIP DISCHARGE INSTRUCTIONS    Patient: Michelle Moreno MRN: [id number]  SSN: [id number]              Please take the time to review the following instructions before you leave the hospital and use them as guidelines during your recovery from surgery. If you have any questions you may contact my office at [phone]  After business hours or during the weekend you can contact me through [address] or text / call at [phone] (cell phone) for emergency's. Please use the office number during regular business hours. SPECIAL INSTRUCTIONS :   1. Do not bend greater than 90 degrees at the hip for 4 weeks following your discharge  2. Avoid exercises or activities which bring the leg out or away from the mid-line of the body. The surgical repair involves this muscle and it will require 4 weeks to heal. You may disregard these instructions for a direct anterior approach. 3. You may walk as tolerated and are encouraged to work daily on progressing your activities with a walker initially. 4. You may transition to a cane for walking 5-7 days from surgery once you feel safe. You may use a walker for longer periods if you feel unstable. 5. Call my office for routine questions M-F at [phone]. You may contact me directly through [address] [place] [address] 95 if there are specific questions or text / call using my cell number [phone]. DRESSING :     Post-op Dressings : This should be removed by physical therapy or you may remove this yourself 7 days after the date of your surgery. If there is no drainage, then a simple dressing may be used or no dressing at all. Other dressing options can be purchased over the counter at a local pharmacy or medical supply vendor. A porous adhesive dressing such as pictured above can be purchased at a local [employer] or [employer]. You only need to keep the incision covered for 7 days after showers.  A dressing may be used for longer if there are issues with clothing clinging to the incision. Showering/ Bathing: You may shower with the Post-op dressing in place. This is left in place for 7 days following discharge from the hospital. If your incision is dry without drainage you may shower following your discharge home. After 7 days your dressing should be removed for showering. It is fine to have water run over the incision. Do not vigorously scrub your incision. Apply a clean, dry dressing after you have dried your incision. Do not take a bath or get into a swimming pool / [place] until you follow up with Dr. Jailyn Bailon. Do not soak your incision under water. If there is continued drainage or you are concerned contact Dr Katelin Grullon office prior to showering [phone] ext [phone] . Diet:  You may advance to your regular diet as tolerated. Increase your clear liquid intake for the next 2-3 days. Medication:      1. You will be given prescriptions for pain medication when you are discharged from the hospital. The side effects of these medications can be substantial and the narcotic medications are not mandatory. You may substitute these medications with Tylenol or Alleve / Motrin. 2. Please use the medications as prescribed. Pain medications may cause constipation- Colace twice daily and Miralax one scoop daily while taking the narcotic medication should help prevent constipation. Please discuss with your local pharmacist regarding increasing this dosage if constipation persists. Other possible side effects of pain medication are dizziness, headache, nausea, vomiting, and urinary retention. Discontinue the pain medication if you develop itching, rash, shortness of breath, or difficulties swallowing. If these symptoms become severe or are not relieved by discontinuing the medication, you should seek immediate medical attention. 3. Refills of pain medication are authorized during office hours only (8 AM- 5 PM  Monday thru Friday).  Many of these medication will require you or a family member to pick-up a physical prescription at the office. 4. Medications other than antiinflammatories will not be called into the pharmacy after business hours. 5. You may resume the medication(s) you were taking prior to your surgery. Narcotics may change the effects of some antidepressant medication(s). If you have any questions about possible interactions between your regular medications and the pain medication, you should ask the pharmacist or contact the prescribing physician. 6. If you have constipation which is not improved by oral stool softeners then a Ducolax suppository should be purchased over the counter. 7. Continue the blood thinner (Aspirin or Lovenox) for a total of 30 days following surgery. Follow up appointment:    Please call our office at [phone] for your follow up appointment. This should be scheduled 14 days following the date of surgery. Physical Therapy / Nursing:    Physical Therapy following surgery will be arranged as an outpatient or at home. They have specific instructions for rehab and wound care. It is fine to have physical therapy remove your dressing at 7 days following surgery. Returning to work:    Normal return to work is 6-12 weeks following surgery. Depending on your progression following surgery and specific job duties you may take longer for a full return to work. DRIVING    You should not return to driving until you are off all opioid pain medications and able to safely and quickly apply the brakes. This is normally 2-6 weeks for left sided surgery and 2-8 weeks for right sided surgery. Important Signs and Symptoms:    If any of the following signs or symptoms occur, you should contact Dr. Oswald Class office.   Please be advised if a problem arises which you feel requires immediate medical attention or you are unable to contact Dr. Margret Rodríguez office you should seek immediate medical attention at the ER or other health care facility you have access to.    1. A sudden increase in swelling and/or redness or warmth at the area your surgery was performed which isnt relieved by rest, ice, and elevation. 2. Oral temperature greater than 101 degrees for 12 hours or more which isnt relieved by an increase in fluid intake and taking 2 Tylenol every 4-6 hours. 3. Excessive drainage from your incisions, or drainage which hasnt stopped by 72 hours after your surgery. 4. Fever, chills, shortness of breath, chest pain, nausea, vomiting or other signs and symptoms which are of concern to you. YOUR TOTAL JOINT REPLACEMENT  FREQUENTLY ASKED QUESTIONS   What should I take for pain?  o You will be discharged with four medications for pain (Oxycodone, Tramadol, Ibuprofen and Tylenol). These may vary slightly depending on what you were taking in the hospital.   - 1st Line - Tylenol Arthritis Strength - 325-650 mg every 4 hours (scheduled for the 1st 24 hours)  - 2nd Line -  Ibuprofen 400 (2 tabs) - 800 (4 tabs) mg every 8 hours  (scheduled for the 1st 24 hours)  - 3rd Line - Oxycodone 5 mg (1-2 tablets every 4-6 hrs)  - 4th Line - Tramadol 50 mg (1-2 tablets every 4-6 hours) - take these between Oxycodone doses if your pain is not alleviated.  When should I call for advice regarding my pain?  o If your pain is still uncontrolled after being on the regimen above for at least 12 hours, please call the office [phone] or text / call my cell after hours [phone].  Can I get refills?  o Opioid refills are provided for the first 2-6 weeks following surgery. o Use Tylenol 500 mg along with Aleve 220mg twice daily or Motrin 200-800mg every 4-6 hours during the daytime hours after two weeks. - After two weeks, I suggest the opioid pain medications be used only 1 hour prior to your physical therapy appointment and 1 hour before sleeping at night. Use Tylenol and Ibuprofen at other times during the day. - Keep in mind that you will need to discontinue opioids before you resume driving.  Is swelling normal?  o Almost everyone has some degree of swelling following surgery. o Following hip and knee replacement surgery, swelling can be normal below the incision for the first few weeks. - This swelling peaks around 5-7 days after surgery. - It is not unusual to have some bruising about the back of the thigh, calf, ankle, and foot.  What should I do for the swelling?  o Keep the limb elevated above the level of your heart - 'Toes above Nose'. o Apply compression socks (knee high for total knees and up to the mid-thigh for total hips). o Use the ice packs that you are discharged home with several times a day for the first several weeks.  How long should I remain on blood thinners following surgery? o 30 days   Which blood thinners will I be on? Can I take them with Tylenol?  o  Aspirin 81 mg twice daily - these should be taken with meals and can be used with Tylenol. In certain instances, you may be sent home on Lovenox for 30 days. o For short periods of time (30 days), aspirin and anti-inflammatories (i.e. Aleve, Motrin / Advil / ibuprofen, diclofenac, etc. can be taken together).  When can I drive?  o Once you have stopped using regular narcotic pain medications (Oxycodone, Percocet, Lortab, Norco etc.) and can safely apply the brakes without hesitation, (emergency braking).  When can I shower?  o You may shower immediately if your Optifoam bandage is dry and without discharge. The Optifoam dressing should be removed 7 days following surgery, after which you may continue to shower.  o No submersion of the incision, bathing or swimming for 14 days following surgery or until cleared by Dr Ricky Qureshi.    Can I remove this dressing?  o Yes, this is removed just like removing a band aid.  o If you are concerned, this can be removed by your therapist.   Josey Barraza What do I do with the dressing when I shower?  o The Optifoam dressing is waterproof and you may shower with it.   o The incision is sealed with Dermabond, a biologic skin glue, which also serves as a watertight seal. If your incision is draining, it is no longer considered to be watertight - you should contact our office prior to showering if you experience any drainage.  Which dressing should I purchase after I remove my Optifoam?  o An occlusive dressing which covers your entire incision. This does not have to be waterproof, but will need to be removed when you shower and then replaced. (Example Only)   How active should I be following surgery? o Progress activities in moderation and at your own pace.   o Walking room to room in your house is encouraged. o Walk each day and set progressive goals with small increments (1st week -1/2 block of walking, 2nd week - 1 block, 3rd week - 2 blocks, etc.)   Will I need help at home?  o You will likely need a caretaker who should be available for the first week following surgery. It is fine for family members to work during the day, as long as they are available by phone. o Planning ahead makes coming home from the hospital a much easier transition.  How long will my surgery take?  o On average, total joint replacement takes approximately 1-2 hour.   o The entire process, including pre-op and post-op care can last as long as 4- 5 hours before you are transferred to your room. o stroke, pulmonary embolism (a clot going from the legs to the lungs), and even death with surgery.  Will I be given antibiotics? Will I need antibiotics at discharge?  o Antibiotics will be given to you both before and after your procedure. To further minimize the risk of infection, we have streamlined the surgical procedure to take less time in the operating room.    o You do not require antibiotics following surgery.       Please do not hesitate to contact me through [employer] or by text / call me at [phone] (cell phone) for questions following surgery - Jani Gerardo, MD Aster Rodriguez MD  Cell [phone]  Michelle Alonso PA-C  Cell [phone]  Medical Staff : Bari Olivia @ [phone]

## 2021-05-24 NOTE — PROGRESS NOTES
5/24/2021 3:15 PM  Case management consult for discharge planning received. Reason for Admission: Elective admit under Dr. Sd David   Procedure(s):  RIGHT TOTAL HIP REPLACEMENT, DIRECT ANTERIOR (PINO)     Assessment:   [x]In person with pt   Charted address and phone numbers confirmed. Observation notice provided in writing to patient and/or caregiver as well as verbal explanation of the policy. Patients who are in outpatient status also receive the Observation notice. Patient has received notice and or patient representative has received via secure email, fax, or certified mail based on patient representative's preference. RUR: n/a under Obs   Risk Level: [x]Low []Moderate []High  Value-based purchasing: [] Yes [x] No  Bundle patient: [] Yes [x] No   Specify:     Advance Directive: Full Code. [x] No AD on file. [] AD on file. [] Current AD not on file. Copy requested. [] Requests AD, and referral submitted to [facility].      Healthcare Decision Maker:         Assessment:    Age:  70    Sex: [] Male [x]Female     Residency: []Private residence [x]Apartment []Assisted Living []LTC []Other:   Exterior Steps: 1  Interior Steps: 0    Lives With: []With spouse [x]Other family members []Underage children []Alone []Care provider []Other:    Prior functioning:  [x]Independent with ADLs and iADLS []Dependent with ADLs and iADLs []Partial dependence, Specify:     Prior DME required:  [x]None []RW []Cane []Crutches []Bedside commode []CPAP []Home O2 (Liter/Provider: ) []Nebulizer   []Shower Chair []Wheelchair []Hospital Bed []Aarti []Stair lift []Rollator []Other:    DME available: []None [x]RW [x]Cane []Crutches []Bedside commode []CPAP []Home O2 (Liter/Provider: ) []Nebulizer   []Shower Chair []Wheelchair []Hospital Bed []Aarti []Stair lift []Rollator []Other:    Rehab history: [x]None []Outpatient PT []Home Health (Provider/Date: ) []SNF (Provider/Date: ) []IPR (Provider/Date: ) []LTC (Provider/Date: ) []Hospice (Provider/Date: )  []Other:     Discharge Concerns: []Yes [x]No []Unknown   Describe:    Comments:  n/a    Insurer:   Cesar Macdonald Phone: [phone]    Subscriber: Catalina Elizabeth Subscriber#: [id number]    Group#: [id number] Precert#:           PCP: Mary Ball   Address: [address] [address]   Phone number: [phone]   Current patient: [x]Yes []No   Approximate date of last visit: 1 month ago   Access to virtual PCP visits: [x]Yes []No    Pharmacy:  [address] [place] [address] Transport: Pt's daughter, Catherine Beverly       Transition of care plan:    [x] Home with Outpatient PT and outpatient follow-up   Pt aware of OP appt? [x]Yes, Provider: [facility] at [address] [place] [address] scheduled prior to admission for 6/1. []Not scheduled   Transport provider: pt's daughter  CM will follow. FROY Jose    Care Management Interventions  PCP Verified by CM: Yes Alison Hansen Signup: No  Discharge Durable Medical Equipment: No  Physical Therapy Consult: Yes  Occupational Therapy Consult: Yes  Speech Therapy Consult: No  Current Support Network:  Other  Discharge Location  Discharge Placement: Home with outpatient services

## 2021-05-24 NOTE — PROGRESS NOTES
[facility] Dosing Services: 05/24/21  Pepcid dose change per renal P&T protocol  Physician: Dr Alycia Kim    Previous Regimen Pepcid 20 mg po BID   Serum Creatinine Lab Results   Component Value Date/Time    Creatinine 1.32 (H) 05/10/2021 10:05 AM    Creatinine (POC) 1.2 07/15/2020 01:22 PM      Creatinine Clearance Estimated Creatinine Clearance: 41.8 mL/min (A) (by C-G formula based on SCr of 1.32 mg/dL (H)).    BUN Lab Results   Component Value Date/Time    BUN 24 (H) 05/10/2021 10:05 AM       Plan: Changed Pepcid to 20 mg po daily per renal P&T protocol     Thank you  Padmaja Mcmahon, PharmD  [phone]

## 2021-05-24 NOTE — ANESTHESIA PREPROCEDURE EVALUATION
Anesthetic History   No history of anesthetic complications            Review of Systems / Medical History  Patient summary reviewed, nursing notes reviewed and pertinent labs reviewed    Pulmonary  Within defined limits                 Neuro/Psych   Within defined limits           Cardiovascular    Hypertension          Hyperlipidemia    Exercise tolerance: >4 METS     GI/Hepatic/Renal         Renal disease: CRI       Endo/Other    Diabetes: type 2, using insulin    Morbid obesity and arthritis     Other Findings              Physical Exam    Airway  Mallampati: II  TM Distance: > 6 cm  Neck ROM: normal range of motion   Mouth opening: Normal     Cardiovascular  Regular rate and rhythm,  S1 and S2 normal,  no murmur, click, rub, or gallop  Rhythm: regular           Dental    Dentition: Full upper dentures and Lower partial plate     Pulmonary  Breath sounds clear to auscultation               Abdominal  GI exam deferred       Other Findings            Anesthetic Plan    ASA: 3  Anesthesia type: spinal          Induction: Intravenous  Anesthetic plan and risks discussed with: Patient

## 2021-05-25 LAB
ANION GAP SERPL CALC-SCNC: 5 MMOL/L (ref 5–15)
BUN SERPL-MCNC: 20 MG/DL (ref 6–20)
BUN/CREAT SERPL: 18 (ref 12–20)
CALCIUM SERPL-MCNC: 8 MG/DL (ref 8.5–10.1)
CHLORIDE SERPL-SCNC: 110 MMOL/L (ref 97–108)
CO2 SERPL-SCNC: 26 MMOL/L (ref 21–32)
CREAT SERPL-MCNC: 1.13 MG/DL (ref 0.55–1.02)
GLUCOSE BLD STRIP.AUTO-MCNC: 171 MG/DL (ref 65–117)
GLUCOSE BLD STRIP.AUTO-MCNC: 202 MG/DL (ref 65–117)
GLUCOSE BLD STRIP.AUTO-MCNC: 231 MG/DL (ref 65–117)
GLUCOSE BLD STRIP.AUTO-MCNC: 244 MG/DL (ref 65–117)
GLUCOSE SERPL-MCNC: 145 MG/DL (ref 65–100)
HGB BLD-MCNC: 8.7 G/DL (ref 11.5–16)
POTASSIUM SERPL-SCNC: 4.2 MMOL/L (ref 3.5–5.1)
SERVICE CMNT-IMP: ABNORMAL
SODIUM SERPL-SCNC: 141 MMOL/L (ref 136–145)

## 2021-05-25 PROCEDURE — 74011250637 HC RX REV CODE- 250/637: Performed by: PHYSICIAN ASSISTANT

## 2021-05-25 PROCEDURE — 97116 GAIT TRAINING THERAPY: CPT

## 2021-05-25 PROCEDURE — 74011250636 HC RX REV CODE- 250/636: Performed by: PHYSICIAN ASSISTANT

## 2021-05-25 PROCEDURE — 97530 THERAPEUTIC ACTIVITIES: CPT

## 2021-05-25 PROCEDURE — 99218 HC RM OBSERVATION: CPT

## 2021-05-25 PROCEDURE — 82962 GLUCOSE BLOOD TEST: CPT

## 2021-05-25 PROCEDURE — 85018 HEMOGLOBIN: CPT

## 2021-05-25 PROCEDURE — 96374 THER/PROPH/DIAG INJ IV PUSH: CPT

## 2021-05-25 PROCEDURE — 74011250637 HC RX REV CODE- 250/637: Performed by: ORTHOPAEDIC SURGERY

## 2021-05-25 PROCEDURE — 74011636637 HC RX REV CODE- 636/637: Performed by: PHYSICIAN ASSISTANT

## 2021-05-25 PROCEDURE — 97535 SELF CARE MNGMENT TRAINING: CPT

## 2021-05-25 PROCEDURE — 80048 BASIC METABOLIC PNL TOTAL CA: CPT

## 2021-05-25 PROCEDURE — 94760 N-INVAS EAR/PLS OXIMETRY 1: CPT

## 2021-05-25 PROCEDURE — 74011000250 HC RX REV CODE- 250: Performed by: PHYSICIAN ASSISTANT

## 2021-05-25 PROCEDURE — 36415 COLL VENOUS BLD VENIPUNCTURE: CPT

## 2021-05-25 PROCEDURE — 97165 OT EVAL LOW COMPLEX 30 MIN: CPT

## 2021-05-25 RX ORDER — DOCUSATE SODIUM 100 MG/1
100 CAPSULE, LIQUID FILLED ORAL 2 TIMES DAILY
Qty: 60 CAPSULE | Refills: 0 | Status: SHIPPED | OUTPATIENT
Start: 2021-05-25

## 2021-05-25 RX ORDER — OXYCODONE HYDROCHLORIDE 5 MG/1
5-10 TABLET ORAL
Qty: 50 TABLET | Refills: 0 | Status: SHIPPED | OUTPATIENT
Start: 2021-05-25 | End: 2021-06-01

## 2021-05-25 RX ORDER — ONDANSETRON 4 MG/1
4 TABLET, FILM COATED ORAL
Qty: 10 TABLET | Refills: 0 | Status: SHIPPED | OUTPATIENT
Start: 2021-05-25

## 2021-05-25 RX ADMIN — ATORVASTATIN CALCIUM 80 MG: 20 TABLET, FILM COATED ORAL at 08:31

## 2021-05-25 RX ADMIN — INSULIN LISPRO 3 UNITS: 100 INJECTION, SOLUTION INTRAVENOUS; SUBCUTANEOUS at 18:24

## 2021-05-25 RX ADMIN — POLYETHYLENE GLYCOL 3350 17 G: 17 POWDER, FOR SOLUTION ORAL at 08:32

## 2021-05-25 RX ADMIN — CEFAZOLIN 2 G: 1 INJECTION, POWDER, FOR SOLUTION INTRAMUSCULAR; INTRAVENOUS at 09:55

## 2021-05-25 RX ADMIN — Medication 81 MG: at 08:31

## 2021-05-25 RX ADMIN — OXYCODONE 5 MG: 5 TABLET ORAL at 08:36

## 2021-05-25 RX ADMIN — Medication 81 MG: at 18:25

## 2021-05-25 RX ADMIN — METFORMIN HYDROCHLORIDE 850 MG: 850 TABLET ORAL at 08:31

## 2021-05-25 RX ADMIN — OXYCODONE 5 MG: 5 TABLET ORAL at 01:07

## 2021-05-25 RX ADMIN — FAMOTIDINE 20 MG: 20 TABLET ORAL at 18:25

## 2021-05-25 RX ADMIN — DOCUSATE SODIUM 50MG AND SENNOSIDES 8.6MG 1 TABLET: 8.6; 5 TABLET, FILM COATED ORAL at 08:31

## 2021-05-25 RX ADMIN — SODIUM CHLORIDE 125 ML/HR: 9 INJECTION, SOLUTION INTRAVENOUS at 05:33

## 2021-05-25 RX ADMIN — DOCUSATE SODIUM 50MG AND SENNOSIDES 8.6MG 1 TABLET: 8.6; 5 TABLET, FILM COATED ORAL at 18:25

## 2021-05-25 RX ADMIN — CEFAZOLIN 2 G: 1 INJECTION, POWDER, FOR SOLUTION INTRAMUSCULAR; INTRAVENOUS at 00:55

## 2021-05-25 RX ADMIN — ACETAMINOPHEN 650 MG: 325 TABLET ORAL at 00:55

## 2021-05-25 RX ADMIN — OXYCODONE 10 MG: 5 TABLET ORAL at 18:25

## 2021-05-25 RX ADMIN — OXYCODONE 10 MG: 5 TABLET ORAL at 14:48

## 2021-05-25 RX ADMIN — ACETAMINOPHEN 650 MG: 325 TABLET ORAL at 05:33

## 2021-05-25 RX ADMIN — INSULIN LISPRO 2 UNITS: 100 INJECTION, SOLUTION INTRAVENOUS; SUBCUTANEOUS at 08:31

## 2021-05-25 RX ADMIN — METFORMIN HYDROCHLORIDE 850 MG: 850 TABLET ORAL at 18:25

## 2021-05-25 RX ADMIN — ACETAMINOPHEN 650 MG: 325 TABLET ORAL at 18:25

## 2021-05-25 NOTE — PROGRESS NOTES
Problem: Mobility Impaired (Adult and Pediatric)  Goal: *Acute Goals and Plan of Care (Insert Text)  Description: FUNCTIONAL STATUS PRIOR TO ADMISSION: Patient was independent and active without use of DME.    HOME SUPPORT PRIOR TO ADMISSION: The patient lived with son but did not require assistance. Physical Therapy Goals  Initiated 5/24/2021    1. Patient will move from supine to sit and sit to supine , scoot up and down, and roll side to side in bed with independence within 4 days. 2. Patient will perform sit to stand with independence within 4 days. 3. Patient will ambulate with modified independence for 250 feet with the least restrictive device within 4 days. 4. Patient will ascend/descend 14 stairs with 1 handrail(s) with supervision/set-up within 4 days. 5. Patient will verbalize and demonstrate understanding of anterior hip precautions per protocol within 4 days. 6. Patient will perform hip home exercise program per protocol with independence within 4 days. 5/25/2021 1510 by Nessa Carl PTA  Note:   PHYSICAL THERAPY TREATMENT  Patient: Brennen Russ (17 y.o. female)  Date: 5/25/2021  Diagnosis: Primary osteoarthritis of right hip [M16.11]  Right hip pain [M25.551] <principal problem not specified>  Procedure(s) (LRB):  RIGHT TOTAL HIP REPLACEMENT, DIRECT ANTERIOR (PINO) (BLOCK) (Right) 1 Day Post-Op  Precautions: Fall, WBAT  Chart, physical therapy assessment, plan of care and goals were reviewed. ASSESSMENT  Patient continues with skilled PT services. Pt supine to sit with mod assist.Pt CGA sit to stand. Pt ambulated 80ft with RW CGA. PPt with increased pain and stiffness with mobility this afternoon. Pt mod assist back to bed. Current Level of Function Impacting Discharge (mobility/balance): Pt mod assist for bed mobility this afternoon. PLAN :  Patient continues to benefit from skilled intervention to address the above impairments.   Continue treatment per established plan of care. to address goals. Recommendation for discharge: (in order for the patient to meet his/her long term goals)  Per MD recommendation. This discharge recommendation:  Has been made in collaboration with the attending provider and/or case management    IF patient discharges home will need the following DME: rolling walker       SUBJECTIVE:       OBJECTIVE DATA SUMMARY:   Critical Behavior:  Neurologic State: Alert  Orientation Level: Oriented X4  Cognition: Follows commands     Functional Mobility Training:  Bed Mobility:     Supine to Sit: mod assist     Scooting: Supervision        Transfers:  Sit to Stand: Contact guard assistance  Stand to Sit: Contact guard assistance        Bed to Chair: Contact guard assistance                    Balance:  Sitting: Intact  Standing: Intact; With support  Ambulation/Gait Training:  Distance (ft): 80 Feet (ft)  Assistive Device: Gait belt;Walker, rolling  Ambulation - Level of Assistance: Contact guard assistance        Gait Abnormalities: Decreased step clearance        Base of Support: Narrowed     Speed/Melida: Pace decreased (<100 feet/min)  Step Length: Right shortened;Left shortened                Activity Tolerance:   Fair    After treatment patient left in no apparent distress:   Supine in bed    COMMUNICATION/COLLABORATION:   The patients plan of care was discussed with: Physical therapist.     Rigo Garcia PTA   Time Calculation: 23 mins

## 2021-05-25 NOTE — PROGRESS NOTES
The patient was provided a [employer] link to view the pre-operative Joint Replacement Class Video  A Patient Education Book specific to total hip or knee joint replacement surgery was given to the patient in [facility]. The content of the class was presented using an audio power point presentation specific for patients undergoing total hip and knee replacement surgery. Incentive spirometer and CHG bath kits were verbally reviewed. Day of surgery routine and expectations, hospital routine and expectations, nutrition, alcohol, nicotine, medications, infection control, pain management, DVT precautions and equipment, ice therapy, durable medical equipment, exercises, mobility expectations and precautions, home preparation and safety were reviewed in class video. My contact information was shared with the patient to provide further information as requested by the patient related to their upcoming surgery. Patient states that they viewed the joint class video.

## 2021-05-25 NOTE — PROGRESS NOTES
Assisted to [facility] / walker and x1 assist. Much stiffer and wincing with pain than previous assist to [facility]. Pain med given as ordered. See MAR. VSS.

## 2021-05-25 NOTE — PROGRESS NOTES
Problem: Self Care Deficits Care Plan (Adult)  Goal: *Therapy Goal (Edit Goal, Insert Text)  Description: FUNCTIONAL STATUS PRIOR TO ADMISSION: Patient was modified independent for basic and instrumental ADLs. HOME SUPPORT: The patient lived with son but did not require assist. Son is home in the evenings    Occupational Therapy Goals  Initiated 5/25/2021  1. Patient will perform lower body ADLs with AE supervision/set-up within 4 day(s). 2.  Patient will perform upper body ADLs standing 5 mins without fatigue or LOB with supervision/set-up within 4 day(s). 3.  Patient will perform toilet transfer with supervision/set-up within 4 day(s). 4.  Patient will perform all aspects of toileting with supervision/set-up within 4 day(s). 5.  Patient will participate in upper extremity therapeutic exercise/activities with supervision/set-up for 10 minutes within 4 day(s). 6.  Patient will utilize energy conservation techniques during functional activities without cues within 4 day(s).      5/25/2021 1010 by Zana Peterson OT  Outcome: Not Met  OCCUPATIONAL THERAPY EVALUATION  Patient: Alex Gonzalez (32 y.o. female)  Date: 5/25/2021  Primary Diagnosis: Primary osteoarthritis of right hip [M16.11]  Right hip pain [M25.551]  Procedure(s) (LRB):  RIGHT TOTAL HIP REPLACEMENT, DIRECT ANTERIOR (PINO) (BLOCK) (Right) 1 Day Post-Op   Precautions: anterior  precautions+ no extreme movement  Fall, WBAT    ASSESSMENT  Based on the objective data described below, the patient presents with minor decline in basic self care performance due to pain, decreased ROM, and decreased strength in R hip, impaired standing tolerance. Patient demonstrates good safety awareness. Has no DME needs. Will benefit from AE training for increased ADL independence, further training in simple home management tasks for safe return home.  No further skilled OT needs anticipated beyond this setting    Current Level of Function Impacting Discharge (ADLs/self-care): Mod A for LB ADL, CGA during standing tasks     Functional Outcome Measure: The patient scored 65/100 on the Barthel Index outcome measure which is indicative of mobility issues and ADL performance. Other factors to consider for discharge: family assistance available     Patient will benefit from skilled therapy intervention to address the above noted impairments. PLAN :  Recommendations and Planned Interventions: self care training, functional mobility training, therapeutic exercise, balance training, therapeutic activities, endurance activities, patient education, home safety training, and family training/education    Frequency/Duration: Patient will be followed by occupational therapy 5 times a week to address goals. Recommendation for discharge: (in order for the patient to meet his/her long term goals)  No skilled occupational therapy/ follow up rehabilitation needs identified at this time. This discharge recommendation:  Has been made in collaboration with the attending provider and/or case management    IF patient discharges home will need the following DME: patient owns DME required for discharge       SUBJECTIVE:   Patient pleasant an cooperative    OBJECTIVE DATA SUMMARY:   HISTORY:   Past Medical History:   Diagnosis Date    Diabetes ([facility] [address]     Hypercholesterolemia     Hypertension     Menopause     LMP-29years old     Past Surgical History:   Procedure Laterality Date    HX ANKLE FRACTURE TX Right 06/2015    HX BREAST BIOPSY Bilateral 15years old    Bilateral breast surgical biopsies, all benign.  (x3)    HX CATARACT REMOVAL Right 12/2020    HX COLONOSCOPY      HX HIP REPLACEMENT Left     HX HYSTERECTOMY      LMP-29years old    LA BREAST SURGERY PROCEDURE UNLISTED      benign breast tumor       Expanded or extensive additional review of patient history:     Home Situation  Home Environment: Apartment  # Steps to Enter: 1  One/Two Story Residence: Two story, live on 1st floor  # of Interior Steps: 13  Living Alone: No  Support Systems: Child(malcom)  Patient Expects to be Discharged to[de-identified] Apartment  Current DME Used/Available at Home: Cane, straight, Shower chair, Walker, rolling  Tub or Shower Type: Tub/Shower combination    Hand dominance: Right    EXAMINATION OF PERFORMANCE DEFICITS:  Cognitive/Behavioral Status:  Neurologic State: Alert  Orientation Level: Oriented X4  Cognition: Follows commands             Skin: R hip surgical incision    Edema:     Hearing: Auditory  Auditory Impairment: None    Vision/Perceptual:                                Corrective Lenses: Reading glasses    Range of Motion:    AROM: Generally decreased, functional                         Strength:    Strength: Generally decreased, functional                Coordination:  Coordination: Within functional limits  Fine Motor Skills-Upper: Left Intact; Right Intact    Gross Motor Skills-Upper: Left Intact; Right Intact    Tone & Sensation:    Tone: Normal                         Balance:       Functional Mobility and Transfers for ADLs:  Bed Mobility:  Supine to Sit: Contact guard assistance  Scooting: Supervision    Transfers:  Sit to Stand: Contact guard assistance  Stand to Sit: Contact guard assistance  Bed to Chair: Contact guard assistance    ADL Assessment:  Patient was instructed in avoiding extreme planes of movement with Right LE during ADLs and functional mobility with verbal cues. Feeding: Independent    Oral Facial Hygiene/Grooming: Supervision    Bathing: Minimum assistance    Upper Body Dressing: Setup    Lower Body Dressing: Moderate assistance    Toileting: Contact guard assistance                ADL Intervention and task modifications:    Bathing: Patient instructed when bathing to not submerge wound in water, stand to shower or sponge bathe, cover wound with plastic and tape to ensure no water reaches bandage/wound without cues. Patient indicated understanding.   Dressing joint: Patient instructed and demonstrated to don/doff Right LE first/last verbal cues. Patient instructed and demonstrated to don all clothing while sitting prior to standing, doff all clothing to knees while standing, then sit to doff clothing off from knees to feet in order to facilitate fall prevention, pain management, and energy conservation with Supervision. Verbally educated on AE use    Home safety: Patient instructed on home modifications and safety (raise height of ADL objects, appropriate height of chair surfaces, recliner safety, change of floor surfaces, clear pathways) to increase independence and fall prevention. Patient indicated understanding. Standing: Patient instructed and demonstrated to walk up to sink/counter top/surfaces, step into walker to increase safety of joint and fall prevention with Supervision. Instructed to apply concept of hip contraindications to ADLs within the home (no extreme reaching across body to Right side, square off while using objects, slide objects along surfaces). Patient instructed to increase amount of time standing, observe standing position during ADLs in order to increase even weight bearing through bilateral LEs in order to increase independence with ADLs. Goal to be reached 30 days post - op, per orthopedic surgeon or per PT. Patient indicated understanding. Tub transfer: Patient instructed regarding when it is safe to begin transfer into tub (complete stairs with PT, advance exercises with PT high enough to clear tub height). Patient instructed to use the same technique as used with stairs when entering and exiting tub (\"up with the non-surgical, down with the surgical leg\"). Patient indicated understanding. Functional Measure:  Barthel Index:    Bathin  Bladder: 10  Bowels: 10  Groomin  Dressin  Feeding: 10  Mobility: 10  Stairs: 0  Toilet Use: 5  Transfer (Bed to Chair and Back): 10  Total: 65/100        The Barthel ADL Index: Guidelines  1. The index should be used as a record of what a patient does, not as a record of what a patient could do. 2. The main aim is to establish degree of independence from any help, physical or verbal, however minor and for whatever reason. 3. The need for supervision renders the patient not independent. 4. A patient's performance should be established using the best available evidence. Asking the patient, friends/relatives and nurses are the usual sources, but direct observation and common sense are also important. However direct testing is not needed. 5. Usually the patient's performance over the preceding 24-48 hours is important, but occasionally longer periods will be relevant. 6. Middle categories imply that the patient supplies over 50 per cent of the effort. 7. Use of aids to be independent is allowed. Celina Lima., Barthel, D.W. (1583). Functional evaluation: the Barthel Index. [address] [facility] (14)2. Aram Sierra emily RASHMI Galindo, [facility]., Chelle [facility]., [place], [address] [place] [address] (1999). Measuring the change indisability after inpatient rehabilitation; comparison of the responsiveness of the Barthel Index and Functional [place] Measure. Journal of Neurology, Neurosurgery, and Psychiatry, 66(4), 254-361. Lisa Harvey, N.J.A, CHEL Puckett, & Pancho Solis M.A. (2004.) Assessment of post-stroke quality of life in cost-effectiveness studies: The usefulness of the Barthel Index and the EuroQoL-5D.  Quality of Life Research, 15, 647-91         Occupational Therapy Evaluation Charge Determination   History Examination Decision-Making   LOW Complexity : Brief history review  LOW Complexity : 1-3 performance deficits relating to physical, cognitive , or psychosocial skils that result in activity limitations and / or participation restrictions  LOW Complexity : No comorbidities that affect functional and no verbal or physical assistance needed to complete eval tasks       Based on the above components, the patient evaluation is determined to be of the following complexity level: LOW   Pain Rating:  Pain controlled during session    Activity Tolerance:   Fair    After treatment patient left in no apparent distress:    Sitting in chair and Call bell within reach    COMMUNICATION/EDUCATION:   The patients plan of care was discussed with: Registered nurse. Home safety education was provided and the patient/caregiver indicated understanding. and Patient/family have participated as able in goal setting and plan of care. This patients plan of care is appropriate for delegation to \[facility]\"".     Thank you for this referral.  Jesusita Orozco OT  Time Calculation: 28 mins

## 2021-05-25 NOTE — PROGRESS NOTES
Problem: Mobility Impaired (Adult and Pediatric)  Goal: *Acute Goals and Plan of Care (Insert Text)  Description: FUNCTIONAL STATUS PRIOR TO ADMISSION: Patient was independent and active without use of DME.    HOME SUPPORT PRIOR TO ADMISSION: The patient lived with son but did not require assistance. Physical Therapy Goals  Initiated 5/24/2021    1. Patient will move from supine to sit and sit to supine , scoot up and down, and roll side to side in bed with independence within 4 days. 2. Patient will perform sit to stand with independence within 4 days. 3. Patient will ambulate with modified independence for 250 feet with the least restrictive device within 4 days. 4. Patient will ascend/descend 14 stairs with 1 handrail(s) with supervision/set-up within 4 days. 5. Patient will verbalize and demonstrate understanding of anterior hip precautions per protocol within 4 days. 6. Patient will perform hip home exercise program per protocol with independence within 4 days. Note:   PHYSICAL THERAPY TREATMENT  Patient: Annetta Angelo (84 y.o. female)  Date: 5/25/2021  Diagnosis: Primary osteoarthritis of right hip [M16.11]  Right hip pain [M25.551] <principal problem not specified>  Procedure(s) (LRB):  RIGHT TOTAL HIP REPLACEMENT, DIRECT ANTERIOR (PINO) (BLOCK) (Right) 1 Day Post-Op  Precautions: Fall, WBAT  Chart, physical therapy assessment, plan of care and goals were reviewed. ASSESSMENT  Patient continues with skilled PT services. Pt sit to stand with CGA. Pt ambulated 140ft with RW CGA. Pt balance is good on level surface. Pt progressing well with mobility. Pt reports no steps to address to enter home. PT will follow for afternoon treatment. Continue goals. Current Level of Function Impacting Discharge (mobility/balance): Pt is CGA ambulating with RW on level surface. PLAN :  Patient continues to benefit from skilled intervention to address the above impairments.   Continue treatment per established plan of care. to address goals. Recommendation for discharge: (in order for the patient to meet his/her long term goals)  Per MD recommendation. This discharge recommendation:  Has been made in collaboration with the attending provider and/or case management    IF patient discharges home will need the following DME: rolling walker       SUBJECTIVE:       OBJECTIVE DATA SUMMARY:   Critical Behavior:  Neurologic State: Alert  Orientation Level: Oriented X4  Cognition: Follows commands     Functional Mobility Training:  Bed Mobility:     Supine to Sit: Contact guard assistance     Scooting: Supervision        Transfers:  Sit to Stand: Contact guard assistance  Stand to Sit: Contact guard assistance        Bed to Chair: Contact guard assistance                    Balance:  Sitting: Intact  Standing: Intact; With support  Ambulation/Gait Training:  Distance (ft): 140 Feet (ft)  Assistive Device: Gait belt;Walker, rolling  Ambulation - Level of Assistance: Contact guard assistance        Gait Abnormalities: Decreased step clearance        Base of Support: Narrowed     Speed/Melida: Pace decreased (<100 feet/min)  Step Length: Right shortened;Left shortened                  Activity Tolerance:   Good    After treatment patient left in no apparent distress:   Sitting in chair    COMMUNICATION/COLLABORATION:   The patients plan of care was discussed with: Physical therapist.     Albert Pablo PTA   Time Calculation: 23 mins

## 2021-05-25 NOTE — PROGRESS NOTES
Problem: Diabetes Self-Management  Goal: *Disease process and treatment process  Description: Define diabetes and identify own type of diabetes; list 3 options for treating diabetes. Outcome: Progressing Towards Goal  Goal: *Incorporating nutritional management into lifestyle  Description: Describe effect of type, amount and timing of food on blood glucose; list 3 methods for planning meals. Outcome: Progressing Towards Goal     Problem: Falls - Risk of  Goal: *Absence of Falls  Description: Document Lopez Hoffmanavan Fall Risk and appropriate interventions in the flowsheet. Outcome: Progressing Towards Goal  Note: Fall Risk Interventions:  Mobility Interventions: Bed/chair exit alarm, Communicate number of staff needed for ambulation/transfer, Patient to call before getting OOB, PT Consult for mobility concerns, PT Consult for assist device competence         Medication Interventions: Assess postural VS orthostatic hypotension, Bed/chair exit alarm, Evaluate medications/consider consulting pharmacy, Patient to call before getting OOB, Teach patient to arise slowly, Utilize gait belt for transfers/ambulation    Elimination Interventions: Bed/chair exit alarm, Call light in reach, Patient to call for help with toileting needs, Stay With Me (per policy), Toilet paper/wipes in reach, Toileting schedule/hourly rounds              Problem: Patient Education: Go to Patient Education Activity  Goal: Patient/Family Education  Outcome: Progressing Towards Goal     Problem: Patient Education: Go to Patient Education Activity  Goal: Patient/Family Education  Outcome: Progressing Towards Goal     Problem: Diabetes Self-Management  Goal: *Disease process and treatment process  Description: Define diabetes and identify own type of diabetes; list 3 options for treating diabetes.   Outcome: Progressing Towards Goal     Problem: Diabetes Self-Management  Goal: *Incorporating nutritional management into lifestyle  Description: Describe effect of type, amount and timing of food on blood glucose; list 3 methods for planning meals. Outcome: Progressing Towards Goal     Problem: Falls - Risk of  Goal: *Absence of Falls  Description: Document Burrell Canavan Fall Risk and appropriate interventions in the flowsheet. Outcome: Progressing Towards Goal  Note: Fall Risk Interventions:  Mobility Interventions: Bed/chair exit alarm, Communicate number of staff needed for ambulation/transfer, Patient to call before getting OOB, PT Consult for mobility concerns, PT Consult for assist device competence         Medication Interventions: Assess postural VS orthostatic hypotension, Bed/chair exit alarm, Evaluate medications/consider consulting pharmacy, Patient to call before getting OOB, Teach patient to arise slowly, Utilize gait belt for transfers/ambulation    Elimination Interventions: Bed/chair exit alarm, Call light in reach, Patient to call for help with toileting needs, Stay With Me (per policy), Toilet paper/wipes in reach, Toileting schedule/hourly rounds              Problem: Falls - Risk of  Goal: *Absence of Falls  Description: Document Burrell Canavan Fall Risk and appropriate interventions in the flowsheet.   Outcome: Progressing Towards Goal  Note: Fall Risk Interventions:  Mobility Interventions: Bed/chair exit alarm, Communicate number of staff needed for ambulation/transfer, Patient to call before getting OOB, PT Consult for mobility concerns, PT Consult for assist device competence         Medication Interventions: Assess postural VS orthostatic hypotension, Bed/chair exit alarm, Evaluate medications/consider consulting pharmacy, Patient to call before getting OOB, Teach patient to arise slowly, Utilize gait belt for transfers/ambulation    Elimination Interventions: Bed/chair exit alarm, Call light in reach, Patient to call for help with toileting needs, Stay With Me (per policy), Toilet paper/wipes in reach, Toileting schedule/hourly rounds Problem: Patient Education: Go to Patient Education Activity  Goal: Patient/Family Education  Outcome: Progressing Towards Goal     Problem: Patient Education: Go to Patient Education Activity  Goal: Patient/Family Education  Outcome: Progressing Towards Goal

## 2021-05-25 NOTE — PROGRESS NOTES
PHYSICAL THERAPY:Pt has D/C order. Pt has not been cleared by PT. Pt with increased pain to surgical hip. Pt feels she did to much this AM treatment causing more pain and discomfort this afternoon. Pt and daughter agree PT will follow in AM a clear after PT treatment.

## 2021-05-25 NOTE — PROGRESS NOTES
Bedside and Verbal shift change report given to Stefania Shepherd (oncoming nurse) by Marcel Livingston RN (offgoing nurse). Report included the following information SBAR, Kardex, Intake/Output, MAR and Recent Results.

## 2021-05-25 NOTE — PROGRESS NOTES
5/25/2021 3:50 PM Discharge order noted. Pt has outpatient PT arranged. Family will transport pt home. No further needs identified. Johnson Litten, BSW    Care Management Interventions  PCP Verified by CM: Yes Kayla Hansen Signup: No  Discharge Durable Medical Equipment: No  Physical Therapy Consult: Yes  Occupational Therapy Consult: Yes  Speech Therapy Consult: No  Current Support Network:  Other  Discharge Location  Discharge Placement: Home with outpatient services

## 2021-05-25 NOTE — PROGRESS NOTES
TOTAL HIP ARTHROPLASTY DAILY NOTE     ASSESSMENT / PLAN :   1. Pain Control : Excellent - Able to sleep and participate with therapy  2. Overnight Issues : none reported  3. Wound or incisional issue : Healing incision with no visible drainage  4. Therapy / Weight Bearing Recommendations : Weight bear as tolerated with use of a walker and two person assist while mobilizing  5. DVT Prophylaxis : Mechanical and Aspirin and mechanical lower extremity compression device  6. Disposition : Home - outpatient PT  7. Medical Concerns : none - stable  8. Comments : Discharge home today if cleared by PT       POD  1 Day Post-Op s/p Procedure(s):  RIGHT TOTAL HIP REPLACEMENT, DIRECT ANTERIOR (PINO) (BLOCK)     SUBJECTIVE :     Concerns : none. OBJECTIVE :     Vitals:    05/24/21 2016 05/24/21 2330 05/25/21 0431 05/25/21 0720   BP: 129/70 102/61 123/69 138/73   Pulse: 88 94 100 95   Resp: 16 15 16 16   Temp: 98.4 °F (36.9 °C) 98.8 °F (37.1 °C) 98.4 °F (36.9 °C) 99.3 °F (37.4 °C)   SpO2: 95% 94% 93% 94%   Weight:       Height:       LMP: 02/21/1988       Alert and oriented x3. right exam of the hip reveals that the dressing is clean, dry and intact. The patient is able to fire the quadriceps / flex at the hip  Sensation is intact to light touch. No calf pain. ANTICOAGULANTS / LABS :       Key Anti-Platelet Anticoagulant Meds             aspirin 81 mg chewable tablet Take 1 tablet by mouth daily.           Labs:  Recent Labs     05/25/21  0421   HGB 8.7*      K 4.2   *   CO2 26   BUN 20   CREA 1.13*   *        Patient mobility  Gait  Base of Support: Widened  Speed/Melida: Pace decreased (<100 feet/min)  Step Length: Right shortened, Left shortened  Swing Pattern: Right asymmetrical  Stance: Right decreased  Gait Abnormalities: Antalgic, Decreased step clearance, Step to gait  Ambulation - Level of Assistance: Contact guard assistance, Minimal assistance  Distance (ft): 35 Feet (ft)  Assistive Device: Gait belt, Walker, rolling        Michelle Suarez MD  Cell [phone]  Jameson Morel PA-C  Cell [phone]  Medical Assistants: Murphy Ahumada [phone]                 Surgery Scheduler: YOVANI Worthington [phone] ext [phone]

## 2021-05-26 VITALS
RESPIRATION RATE: 17 BRPM | OXYGEN SATURATION: 95 % | BODY MASS INDEX: 37.42 KG/M2 | SYSTOLIC BLOOD PRESSURE: 127 MMHG | WEIGHT: 211.2 LBS | HEIGHT: 63 IN | HEART RATE: 101 BPM | TEMPERATURE: 98.8 F | DIASTOLIC BLOOD PRESSURE: 69 MMHG

## 2021-05-26 LAB
GLUCOSE BLD STRIP.AUTO-MCNC: 167 MG/DL (ref 65–117)
GLUCOSE BLD STRIP.AUTO-MCNC: 179 MG/DL (ref 65–117)
GLUCOSE BLD STRIP.AUTO-MCNC: 216 MG/DL (ref 65–117)
SERVICE CMNT-IMP: ABNORMAL

## 2021-05-26 PROCEDURE — 82962 GLUCOSE BLOOD TEST: CPT

## 2021-05-26 PROCEDURE — 97116 GAIT TRAINING THERAPY: CPT

## 2021-05-26 PROCEDURE — 99218 HC RM OBSERVATION: CPT

## 2021-05-26 PROCEDURE — 74011636637 HC RX REV CODE- 636/637: Performed by: PHYSICIAN ASSISTANT

## 2021-05-26 PROCEDURE — 97530 THERAPEUTIC ACTIVITIES: CPT

## 2021-05-26 PROCEDURE — 74011250637 HC RX REV CODE- 250/637: Performed by: PHYSICIAN ASSISTANT

## 2021-05-26 PROCEDURE — 74011250637 HC RX REV CODE- 250/637: Performed by: NURSE PRACTITIONER

## 2021-05-26 RX ORDER — ASPIRIN 81 MG/1
81 TABLET ORAL 2 TIMES DAILY
Qty: 60 TABLET | Refills: 0 | Status: SHIPPED | OUTPATIENT
Start: 2021-05-26

## 2021-05-26 RX ORDER — ONDANSETRON 4 MG/1
4 TABLET, ORALLY DISINTEGRATING ORAL
Status: DISCONTINUED | OUTPATIENT
Start: 2021-05-26 | End: 2021-05-26 | Stop reason: HOSPADM

## 2021-05-26 RX ADMIN — Medication 10 ML: at 01:39

## 2021-05-26 RX ADMIN — ACETAMINOPHEN 650 MG: 325 TABLET ORAL at 01:39

## 2021-05-26 RX ADMIN — METFORMIN HYDROCHLORIDE 850 MG: 850 TABLET ORAL at 09:03

## 2021-05-26 RX ADMIN — OXYCODONE 10 MG: 5 TABLET ORAL at 09:03

## 2021-05-26 RX ADMIN — Medication 10 ML: at 05:14

## 2021-05-26 RX ADMIN — POLYETHYLENE GLYCOL 3350 17 G: 17 POWDER, FOR SOLUTION ORAL at 09:03

## 2021-05-26 RX ADMIN — INSULIN LISPRO 3 UNITS: 100 INJECTION, SOLUTION INTRAVENOUS; SUBCUTANEOUS at 11:34

## 2021-05-26 RX ADMIN — ONDANSETRON 4 MG: 4 TABLET, ORALLY DISINTEGRATING ORAL at 11:34

## 2021-05-26 RX ADMIN — OXYCODONE 5 MG: 5 TABLET ORAL at 01:46

## 2021-05-26 RX ADMIN — DOCUSATE SODIUM 50MG AND SENNOSIDES 8.6MG 1 TABLET: 8.6; 5 TABLET, FILM COATED ORAL at 09:03

## 2021-05-26 RX ADMIN — ACETAMINOPHEN 650 MG: 325 TABLET ORAL at 11:34

## 2021-05-26 RX ADMIN — Medication 81 MG: at 09:03

## 2021-05-26 RX ADMIN — INSULIN LISPRO 2 UNITS: 100 INJECTION, SOLUTION INTRAVENOUS; SUBCUTANEOUS at 09:03

## 2021-05-26 RX ADMIN — INSULIN LISPRO 2 UNITS: 100 INJECTION, SOLUTION INTRAVENOUS; SUBCUTANEOUS at 01:45

## 2021-05-26 RX ADMIN — ATORVASTATIN CALCIUM 80 MG: 20 TABLET, FILM COATED ORAL at 09:02

## 2021-05-26 RX ADMIN — ACETAMINOPHEN 650 MG: 325 TABLET ORAL at 05:14

## 2021-05-26 NOTE — PROGRESS NOTES
Chart reviewed- approached patient for OT session twice today with nausea limiting participation today. Patient reports good understanding of AE use for ADLs, all DME needs met. Deferring OT due to nausea. Will follow up as able.  Karla Karimi OTR/L

## 2021-05-26 NOTE — PROGRESS NOTES
POD  2 Days Post-Op s/p Procedure(s):  RIGHT TOTAL HIP REPLACEMENT, DIRECT ANTERIOR (PINO) (BLOCK)    SUBJECTIVE :   The patient is stable and Preparing for discharge today. Overnight events : none.       PHYSICAL EXAM :  Patient Vitals for the past 12 hrs:   Temp Pulse Resp BP SpO2   05/26/21 0352 98.8 °F (37.1 °C) (!) 107 16 115/66 95 %   05/25/21 2254 100.2 °F (37.9 °C) (!) 116 15 118/62 93 %   05/25/21 1952 99.7 °F (37.6 °C) (!) 117 15 (!) 151/72 94 %       Nml hip and quad function  Dressing is C/D/I  Normal Motor and sensory function    Recent Labs     05/25/21  0421   HGB 8.7*   K 4.2   CREA 1.13*   *          - THERAPY : Gait  Base of Support: Narrowed  Speed/Melida: Pace decreased (<100 feet/min)  Step Length: Right shortened, Left shortened  Swing Pattern: Right asymmetrical  Stance: Right decreased  Gait Abnormalities: Decreased step clearance  Ambulation - Level of Assistance: Contact guard assistance  Distance (ft): 140 Feet (ft)  Assistive Device: Gait belt, Walker, rolling,        ASSESSMENT / PLAN :   - Stable following ZAIN  - Home today if cleared by PT      Loida Oleary MD  Cell [phone], RADHA  Cell [phone]  Medical Staff : Anand Morales  Office : [phone]

## 2021-05-26 NOTE — PROGRESS NOTES
Problem: Diabetes Self-Management  Goal: *Disease process and treatment process  Description: Define diabetes and identify own type of diabetes; list 3 options for treating diabetes. Outcome: Resolved/Met  Goal: *Incorporating nutritional management into lifestyle  Description: Describe effect of type, amount and timing of food on blood glucose; list 3 methods for planning meals. Outcome: Resolved/Met  Goal: *Incorporating physical activity into lifestyle  Description: State effect of exercise on blood glucose levels. Outcome: Resolved/Met  Goal: *Developing strategies to promote health/change behavior  Description: Define the ABC's of diabetes; identify appropriate screenings, schedule and personal plan for screenings. Outcome: Resolved/Met  Goal: *Using medications safely  Description: State effect of diabetes medications on diabetes; name diabetes medication taking, action and side effects. Outcome: Resolved/Met  Goal: *Monitoring blood glucose, interpreting and using results  Description: Identify recommended blood glucose targets  and personal targets. Outcome: Resolved/Met  Goal: *Prevention, detection, treatment of acute complications  Description: List symptoms of hyper- and hypoglycemia; describe how to treat low blood sugar and actions for lowering  high blood glucose level. Outcome: Resolved/Met  Goal: *Prevention, detection and treatment of chronic complications  Description: Define the natural course of diabetes and describe the relationship of blood glucose levels to long term complications of diabetes.   Outcome: Resolved/Met  Goal: *Developing strategies to address psychosocial issues  Description: Describe feelings about living with diabetes; identify support needed and support network  Outcome: Resolved/Met  Goal: *Insulin pump training  Outcome: Resolved/Met  Goal: *Sick day guidelines  Outcome: Resolved/Met  Goal: *Patient Specific Goal (EDIT GOAL, INSERT TEXT)  Outcome: Resolved/Met

## 2021-05-26 NOTE — PROGRESS NOTES
5/26/2021 2:18 PM EMR reviewed, discharge order noted. No further discharge needs identified. FROY Seals    Care Management Interventions  PCP Verified by CM: Yes Nicole Hansen Signup: No  Discharge Durable Medical Equipment: No  Physical Therapy Consult: Yes  Occupational Therapy Consult: Yes  Speech Therapy Consult: No  Current Support Network:  Other  Discharge Location  Discharge Placement: Home with outpatient services

## 2021-05-26 NOTE — PROGRESS NOTES
Problem: Mobility Impaired (Adult and Pediatric)  Goal: *Acute Goals and Plan of Care (Insert Text)  Description: FUNCTIONAL STATUS PRIOR TO ADMISSION: Patient was independent and active without use of DME.    HOME SUPPORT PRIOR TO ADMISSION: The patient lived with son but did not require assistance. Physical Therapy Goals  Initiated 5/24/2021    1. Patient will move from supine to sit and sit to supine , scoot up and down, and roll side to side in bed with independence within 4 days. 2. Patient will perform sit to stand with independence within 4 days. 3. Patient will ambulate with modified independence for 250 feet with the least restrictive device within 4 days. 4. Patient will ascend/descend 14 stairs with 1 handrail(s) with supervision/set-up within 4 days. 5. Patient will verbalize and demonstrate understanding of anterior hip precautions per protocol within 4 days. 6. Patient will perform hip home exercise program per protocol with independence within 4 days. Note:   PHYSICAL THERAPY TREATMENT  Patient: Michelle Moreno (89 y.o. female)  Date: 5/26/2021  Diagnosis: Primary osteoarthritis of right hip [M16.11]  Right hip pain [M25.551] <principal problem not specified>  Procedure(s) (LRB):  RIGHT TOTAL HIP REPLACEMENT, DIRECT ANTERIOR (PINO) (BLOCK) (Right) 2 Days Post-Op  Precautions: Fall, WBAT  Chart, physical therapy assessment, plan of care and goals were reviewed. ASSESSMENT  Patient continues with skilled PT services. Pt reports much decreased surgical hip pain this AM.Pt CGA supine to sit. Pt vomitus after sitting. Pts stomach settled but pt  with continued nausea. Pt sit stand with CGA. Pt took 4 steps to bedside commode. After use of commode pt ambulated 100ft with RW CGA. Pt left sitting. Nurse notified. Pt has steps to enter home. Pt is cleared for D/C by PT. PLAN :  Patient continues to benefit from skilled intervention to address the above impairments.   Continue treatment per established plan of care. to address goals.     Recommendation for discharge: (in order for the patient to meet his/her long term goals)  Per MD recommendation    This discharge recommendation:  Has been made in collaboration with the attending provider and/or case management    IF patient discharges home will need the following DME: rolling walker       SUBJECTIVE:       OBJECTIVE DATA SUMMARY:   Critical Behavior:  Neurologic State: Alert  Orientation Level: Oriented X4  Cognition: Appropriate decision making, Appropriate for age attention/concentration, Appropriate safety awareness, Follows commands     Functional Mobility Training:  Bed Mobility:     Supine to Sit: Contact guard assistance;Minimum assistance              Transfers:  Sit to Stand: Contact guard assistance  Stand to Sit: Contact guard assistance                             Balance:  Sitting: Intact  Standing: With support  Ambulation/Gait Training:  Distance (ft): 90 Feet (ft)  Assistive Device: Gait belt;Walker, rolling  Ambulation - Level of Assistance: Contact guard assistance        Gait Abnormalities: Antalgic;Decreased step clearance        Base of Support: Narrowed     Speed/Mel[place]: Pace decreased (<100 feet/min)  Step Length: Right shortened;Left shortened                    Activity Tolerance:   Fair to good    After treatment patient left in no apparent distress:   Sitting in chair    COMMUNICATION/COLLABORATION:   The patients plan of care was discussed with: Physical therapist.     Jordan Chowdhury PTA   Time Calculation: 38 mins

## 2021-06-01 ENCOUNTER — TELEPHONE (OUTPATIENT)
Dept: SURGERY | Age: 76
End: 2021-06-01

## 2021-06-01 NOTE — TELEPHONE ENCOUNTER
Post Discharge Phone placed to patient after Joint Replacement surgery   Spoke with patient     Patient is taking narcotics, tylenol   States pain is tolerable and pain medication is effective.    Patient was able to fill prescriptions, no medication questions    States discharge instructions were clear and easy to understand has no questions  Patient is using a walker without difficulty, moving every hour  Able to do exercises regularly  Bruising is minimal  Swelling is moderate  Patient is elevating leg and using ice with good relief  Education r/t positioning provided  States dressing is intact without drainage, x2, removal instructions given  Denies N/V, appetite is better now  Constipation is none, +BM, taking stool softeners, drinking lots of water  Follow up appointment is scheduled with surgeon   PT is scheduled today, went very well  Denies fever, cough, chest pain, back pain, SOB  Patient has been checking B/P and it is fine, has had nose bleeds in the am when she blows her nose lasts 5 mins, yesterday and today, will call PCP tomorrow  Discussed calling surgeon or PCP for concerns  Reminded patient to fill out survey  Opportunity given for patient to ask questions

## 2021-06-03 DIAGNOSIS — I10 ESSENTIAL HYPERTENSION: ICD-10-CM

## 2021-06-03 RX ORDER — LOSARTAN POTASSIUM 100 MG/1
TABLET ORAL
Qty: 30 TABLET | Refills: 0 | Status: CANCELLED | OUTPATIENT
Start: 2021-06-03

## 2021-10-06 ENCOUNTER — TRANSCRIBE ORDER (OUTPATIENT)
Dept: SCHEDULING | Age: 76
End: 2021-10-06

## 2021-10-06 DIAGNOSIS — R22.40 LOCALIZED SWELLING, MASS, OR LUMP OF LOWER EXTREMITY: Primary | ICD-10-CM

## 2022-03-18 PROBLEM — M16.11 PRIMARY OSTEOARTHRITIS OF RIGHT HIP: Status: ACTIVE | Noted: 2021-05-24

## 2022-03-19 PROBLEM — E66.9 OBESITY (BMI 30-39.9): Status: ACTIVE | Noted: 2017-08-03

## 2022-03-19 PROBLEM — I10 ESSENTIAL HYPERTENSION: Status: ACTIVE | Noted: 2017-08-03

## 2023-01-09 ENCOUNTER — TRANSCRIBE ORDER (OUTPATIENT)
Dept: SCHEDULING | Age: 78
End: 2023-01-09

## 2023-01-09 DIAGNOSIS — Z12.31 VISIT FOR SCREENING MAMMOGRAM: Primary | ICD-10-CM

## 2023-01-10 ENCOUNTER — HOSPITAL ENCOUNTER (OUTPATIENT)
Dept: MAMMOGRAPHY | Age: 78
Discharge: HOME | End: 2023-01-10
Attending: FAMILY MEDICINE
Payer: MEDICARE

## 2023-01-10 DIAGNOSIS — Z12.31 VISIT FOR SCREENING MAMMOGRAM: ICD-10-CM

## 2023-01-10 PROCEDURE — 77067 SCR MAMMO BI INCL CAD: CPT

## 2023-05-20 RX ORDER — CHLORTHALIDONE 25 MG/1
25 TABLET ORAL DAILY
COMMUNITY
Start: 2020-03-18

## 2023-05-20 RX ORDER — SENNOSIDES 8.6 MG
650 CAPSULE ORAL PRN
COMMUNITY

## 2023-05-20 RX ORDER — LOSARTAN POTASSIUM 100 MG/1
1 TABLET ORAL DAILY
COMMUNITY
Start: 2020-03-18

## 2023-05-20 RX ORDER — ASPIRIN 81 MG/1
81 TABLET ORAL 2 TIMES DAILY
COMMUNITY
Start: 2021-05-26

## 2023-05-20 RX ORDER — PSEUDOEPHEDRINE HCL 30 MG
100 TABLET ORAL 2 TIMES DAILY
COMMUNITY
Start: 2021-05-25

## 2023-05-20 RX ORDER — ATORVASTATIN CALCIUM 80 MG/1
80 TABLET, FILM COATED ORAL DAILY
COMMUNITY
Start: 2020-03-18

## 2023-05-20 RX ORDER — ONDANSETRON 4 MG/1
4 TABLET, FILM COATED ORAL EVERY 8 HOURS PRN
COMMUNITY
Start: 2021-05-25

## 2023-05-20 RX ORDER — INSULIN GLARGINE 100 [IU]/ML
35 INJECTION, SOLUTION SUBCUTANEOUS 2 TIMES DAILY
COMMUNITY

## 2025-03-03 ENCOUNTER — TRANSCRIBE ORDERS (OUTPATIENT)
Facility: HOSPITAL | Age: 80
End: 2025-03-03

## 2025-03-03 DIAGNOSIS — Z78.0 MENOPAUSE PRESENT: Primary | ICD-10-CM

## 2025-03-13 ENCOUNTER — TRANSCRIBE ORDERS (OUTPATIENT)
Facility: HOSPITAL | Age: 80
End: 2025-03-13

## 2025-03-13 DIAGNOSIS — Z12.31 VISIT FOR SCREENING MAMMOGRAM: Primary | ICD-10-CM

## 2025-03-26 ENCOUNTER — HOSPITAL ENCOUNTER (OUTPATIENT)
Facility: HOSPITAL | Age: 80
Discharge: HOME | End: 2025-03-29
Attending: FAMILY MEDICINE
Payer: MEDICARE

## 2025-03-26 VITALS — HEIGHT: 63 IN | BODY MASS INDEX: 33.49 KG/M2 | WEIGHT: 189 LBS

## 2025-03-26 VITALS — BODY MASS INDEX: 34.78 KG/M2 | HEIGHT: 62 IN | WEIGHT: 189 LBS

## 2025-03-26 DIAGNOSIS — Z12.31 VISIT FOR SCREENING MAMMOGRAM: ICD-10-CM

## 2025-03-26 DIAGNOSIS — Z78.0 MENOPAUSE PRESENT: ICD-10-CM

## 2025-03-26 PROCEDURE — 77080 DXA BONE DENSITY AXIAL: CPT

## 2025-03-26 PROCEDURE — 77063 BREAST TOMOSYNTHESIS BI: CPT

## (undated) DEVICE — STERILE POLYISOPRENE POWDER-FREE SURGICAL GLOVES WITH EMOLLIENT COATING: Brand: PROTEXIS

## (undated) DEVICE — DECANTER BAG 9": Brand: MEDLINE INDUSTRIES, INC.

## (undated) DEVICE — NEEDLE HYPO 18GA L1.5IN PNK S STL HUB POLYPR SHLD REG BVL

## (undated) DEVICE — 4-PORT MANIFOLD: Brand: NEPTUNE 2

## (undated) DEVICE — COVER LT HNDL PLAS RIG 1 PER PK

## (undated) DEVICE — Device

## (undated) DEVICE — KIT DRP FOR RIO ROBOTIC ARM ASST SYS

## (undated) DEVICE — SUTURE STRATAFIX SYMMETRIC SZ 1 L18IN ABSRB VLT CT1 L36CM SXPP1A404

## (undated) DEVICE — HOOD: Brand: FLYTE

## (undated) DEVICE — KIT POS FOAM HANA TBL

## (undated) DEVICE — SOL IRR SOD CL 0.9% 3000ML --

## (undated) DEVICE — DUAL IRRIGATION ADAPTOR

## (undated) DEVICE — GOWN,SIRUS,NONRNF,SETINSLV,2XL,18/CS: Brand: MEDLINE

## (undated) DEVICE — SUTURE VCRL SZ 2-0 L36IN ABSRB UD L36MM CT-1 1/2 CIR J945H

## (undated) DEVICE — MARKER,SKIN,WI/RULER AND LABELS: Brand: MEDLINE

## (undated) DEVICE — REM POLYHESIVE ADULT PATIENT RETURN ELECTRODE: Brand: VALLEYLAB

## (undated) DEVICE — ELECTRODE BLDE L4IN NONINSULATED EDGE

## (undated) DEVICE — PAD NON-ADHERENT 3X4 STRL LF --

## (undated) DEVICE — PREP SKN CHLRAPRP APL 26ML STR --

## (undated) DEVICE — 3M™ IOBAN™ 2 ANTIMICROBIAL INCISE DRAPE 6650EZ: Brand: IOBAN™ 2

## (undated) DEVICE — SUTURE MCRYL SZ 3-0 L27IN ABSRB UD L24MM PS-1 3/8 CIR PRIM Y936H

## (undated) DEVICE — HANDPIECE SET WITH COAXIAL HIGH FLOW TIP AND SUCTION TUBE: Brand: INTERPULSE

## (undated) DEVICE — SUTURE VCRL + SZ 1-0 L36IN ABSRB UD CTX 1/2 CIR TAPR PNT VCP977H

## (undated) DEVICE — SPONGE GZ W4XL4IN COT 12 PLY TYP VII WVN C FLD DSGN

## (undated) DEVICE — STRYKER PERFORMANCE SERIES SAGITTAL BLADE: Brand: STRYKER PERFORMANCE SERIES

## (undated) DEVICE — STERILE POLYISOPRENE POWDER-FREE SURGICAL GLOVES: Brand: PROTEXIS

## (undated) DEVICE — SUT ETHLN 3-0 18IN PS1 BLK --

## (undated) DEVICE — 3M™ STERI-DRAPE™ U-DRAPE 1015: Brand: STERI-DRAPE™

## (undated) DEVICE — Z DUP USE 2701075 SYSTEM SKIN CLSR 42CM DERMBND PRINEO

## (undated) DEVICE — SYR LR LCK 1ML GRAD NSAF 30ML --

## (undated) DEVICE — PREP KIT PEEL PTCH POVIDONE IOD

## (undated) DEVICE — INFECTION CONTROL KIT SYS

## (undated) DEVICE — 6619 2 PTNT ISO SYS INCISE AREA&LT;(&GT;&&LT;)&GT;P: Brand: STERI-DRAPE™ IOBAN™ 2

## (undated) DEVICE — STRAP,POSITIONING,KNEE/BODY,FOAM,4X60": Brand: MEDLINE

## (undated) DEVICE — SHEET, DRAPE, SPLIT, STERILE: Brand: MEDLINE

## (undated) DEVICE — Z DISCONTINUED LINER BOOT TRACTION NS LINDY LF

## (undated) DEVICE — PENCIL SMK EVAC L10FT DIA95MM TBNG NONSTICK W ADPT TO 22MM

## (undated) DEVICE — 3M™ TEGADERM™ TRANSPARENT FILM DRESSING FRAME STYLE, 1626W, 4 IN X 4-3/4 IN (10 CM X 12 CM), 50/CT 4CT/CASE: Brand: 3M™ TEGADERM™

## (undated) DEVICE — DRESSING FOAM W4XL12IN AG SIL ADH ANTIMIC POSTOP OPTIFOAM

## (undated) DEVICE — PIN FIX 4X170MM STRL -- 2/PK MAKO

## (undated) DEVICE — KIT TRK HIP PROC VIZADISC

## (undated) DEVICE — 3M™ TEGADERM™ TRANSPARENT FILM DRESSING FRAME STYLE, 1624W, 2-3/8 IN X 2-3/4 IN (6 CM X 7 CM), 100/CT 4CT/CASE: Brand: 3M™ TEGADERM™

## (undated) DEVICE — MARKER RAD KNEE TIB CKPT STEREOTAXIC IMAG LESION LOC

## (undated) DEVICE — DERMABOND SKIN ADH 0.7ML -- DERMABOND ADVANCED 12/BX

## (undated) DEVICE — NDL PRT INJ NSAF BLNT 18GX1.5 --